# Patient Record
Sex: FEMALE | Race: WHITE | NOT HISPANIC OR LATINO | Employment: FULL TIME | URBAN - NONMETROPOLITAN AREA
[De-identification: names, ages, dates, MRNs, and addresses within clinical notes are randomized per-mention and may not be internally consistent; named-entity substitution may affect disease eponyms.]

---

## 2017-01-13 RX ORDER — MONTELUKAST SODIUM 10 MG/1
TABLET ORAL
Qty: 30 TABLET | Refills: 0 | Status: SHIPPED | OUTPATIENT
Start: 2017-01-13 | End: 2017-01-20

## 2017-01-20 ENCOUNTER — OFFICE VISIT (OUTPATIENT)
Dept: OBSTETRICS AND GYNECOLOGY | Facility: CLINIC | Age: 22
End: 2017-01-20

## 2017-01-20 VITALS
DIASTOLIC BLOOD PRESSURE: 77 MMHG | BODY MASS INDEX: 20.36 KG/M2 | HEIGHT: 59 IN | WEIGHT: 101 LBS | SYSTOLIC BLOOD PRESSURE: 116 MMHG | HEART RATE: 95 BPM

## 2017-01-20 DIAGNOSIS — Z30.41 SURVEILLANCE OF CONTRACEPTIVE PILL: Primary | ICD-10-CM

## 2017-01-20 PROCEDURE — 99213 OFFICE O/P EST LOW 20 MIN: CPT | Performed by: NURSE PRACTITIONER

## 2017-01-20 RX ORDER — NORETHINDRONE ACETATE AND ETHINYL ESTRADIOL AND FERROUS FUMARATE 1MG-20(24)
1 KIT ORAL DAILY
Qty: 84 TABLET | Refills: 4 | Status: SHIPPED | OUTPATIENT
Start: 2017-01-20 | End: 2018-03-15 | Stop reason: SDUPTHER

## 2017-01-20 RX ORDER — MONTELUKAST SODIUM 10 MG/1
1 TABLET ORAL
COMMUNITY
Start: 2016-10-07 | End: 2017-06-27

## 2017-01-20 RX ORDER — NORETHINDRONE ACETATE AND ETHINYL ESTRADIOL AND FERROUS FUMARATE 1MG-20(24)
1 KIT ORAL DAILY
COMMUNITY
Start: 2016-10-07 | End: 2017-01-20 | Stop reason: SDUPTHER

## 2017-01-20 RX ORDER — FLUTICASONE PROPIONATE 50 MCG
1 SPRAY, SUSPENSION (ML) NASAL
COMMUNITY
End: 2019-11-01

## 2017-01-20 NOTE — PROGRESS NOTES
Subjective   Arlette Zaidi is a 22 y.o. G0 here for her annual exam and birth control refills. Has no complaints at this time and declines STD testing.    Gynecologic Exam   The patient's pertinent negatives include no genital itching, genital lesions, genital odor, genital rash, missed menses, pelvic pain, vaginal bleeding or vaginal discharge. Pertinent negatives include no dysuria, headaches, nausea, rash or vomiting. She uses oral contraceptives for contraception. Her menstrual history has been regular. There is no history of an abdominal surgery, a  section, an ectopic pregnancy, a gynecological surgery, herpes simplex, miscarriage, ovarian cysts, an STD, a terminated pregnancy or vaginosis. (Hx of pelvic pain and dysmenorrhea)   Last pap- 6/12/15 WNL  LMP 1 week ago, no problems    The following portions of the patient's history were reviewed and updated as appropriate: allergies, current medications, past family history, past medical history, past social history, past surgical history and problem list.    Review of Systems   Respiratory: Negative for apnea, chest tightness and shortness of breath.    Gastrointestinal: Negative for nausea and vomiting.   Genitourinary: Negative for difficulty urinating, dyspareunia, dysuria, genital sores, menstrual problem, missed menses, pelvic pain, vaginal bleeding, vaginal discharge and vaginal pain.   Skin: Negative for color change and rash.   Neurological: Negative for light-headedness and headaches.   Psychiatric/Behavioral: Negative for agitation, dysphoric mood and sleep disturbance. The patient is not nervous/anxious.        Objective   Physical Exam   Constitutional: She is oriented to person, place, and time. She appears well-developed and well-nourished.   Cardiovascular: Normal rate, regular rhythm and normal heart sounds.    Pulmonary/Chest: Effort normal and breath sounds normal. Right breast exhibits no inverted nipple, no mass, no nipple  discharge, no skin change and no tenderness. Left breast exhibits no inverted nipple, no mass, no nipple discharge, no skin change and no tenderness. Breasts are symmetrical. There is no breast swelling.   Genitourinary: No breast bleeding.   Genitourinary Comments: Pap smear not needed, pt has no complaints at this time. Pelvic exam deferred.   Lymphadenopathy:     She has no axillary adenopathy.   Neurological: She is alert and oriented to person, place, and time.   Skin: Skin is warm, dry and intact.   Psychiatric: She has a normal mood and affect. Her behavior is normal.   Nursing note and vitals reviewed.        Assessment/Plan   Arlette was seen today for contraception.    Diagnoses and all orders for this visit:    Surveillance of contraceptive pill  -     norethindrone-ethinyl estradiol-ferrous fumarate (LOESTIN 24 FE) 1-20 MG-MCG(24) per tablet; Take 1 tablet by mouth Daily.

## 2017-01-20 NOTE — LETTER
January 20, 2017     Patient: Arlette Zaidi   YOB: 1995   Date of Visit: 1/20/2017       To Whom It May Concern:    It is my medical opinion that Arlette Zaidi may return to work on 1/23/17.           Sincerely,          This document has been electronically signed by DEIDRA Milan on January 20, 2017 9:36 AM        DEIDRA Milan    CC: No Recipients

## 2017-02-14 RX ORDER — MONTELUKAST SODIUM 10 MG/1
TABLET ORAL
Qty: 30 TABLET | Refills: 0 | Status: SHIPPED | OUTPATIENT
Start: 2017-02-14 | End: 2017-03-15 | Stop reason: SDUPTHER

## 2017-03-16 RX ORDER — MONTELUKAST SODIUM 10 MG/1
TABLET ORAL
Qty: 30 TABLET | Refills: 0 | Status: SHIPPED | OUTPATIENT
Start: 2017-03-16 | End: 2017-04-15 | Stop reason: SDUPTHER

## 2017-04-17 RX ORDER — MONTELUKAST SODIUM 10 MG/1
TABLET ORAL
Qty: 30 TABLET | Refills: 0 | Status: SHIPPED | OUTPATIENT
Start: 2017-04-17 | End: 2017-05-17 | Stop reason: SDUPTHER

## 2017-05-18 RX ORDER — MONTELUKAST SODIUM 10 MG/1
TABLET ORAL
Qty: 30 TABLET | Refills: 0 | Status: SHIPPED | OUTPATIENT
Start: 2017-05-18 | End: 2017-06-15 | Stop reason: SDUPTHER

## 2017-06-16 RX ORDER — MONTELUKAST SODIUM 10 MG/1
TABLET ORAL
Qty: 14 TABLET | Refills: 0 | Status: SHIPPED | OUTPATIENT
Start: 2017-06-16 | End: 2017-07-01 | Stop reason: SDUPTHER

## 2017-06-27 ENCOUNTER — APPOINTMENT (OUTPATIENT)
Dept: LAB | Facility: HOSPITAL | Age: 22
End: 2017-06-27

## 2017-06-27 ENCOUNTER — OFFICE VISIT (OUTPATIENT)
Dept: PULMONOLOGY | Facility: CLINIC | Age: 22
End: 2017-06-27

## 2017-06-27 VITALS
DIASTOLIC BLOOD PRESSURE: 60 MMHG | SYSTOLIC BLOOD PRESSURE: 99 MMHG | HEART RATE: 85 BPM | BODY MASS INDEX: 19.35 KG/M2 | WEIGHT: 96 LBS | OXYGEN SATURATION: 99 % | HEIGHT: 59 IN

## 2017-06-27 DIAGNOSIS — J30.2 SEASONAL ALLERGIC RHINITIS, UNSPECIFIED ALLERGIC RHINITIS TRIGGER: Primary | ICD-10-CM

## 2017-06-27 PROBLEM — Z09 SURGICAL FOLLOW-UP CARE: Status: ACTIVE | Noted: 2017-04-20

## 2017-06-27 PROCEDURE — 86003 ALLG SPEC IGE CRUDE XTRC EA: CPT | Performed by: INTERNAL MEDICINE

## 2017-06-27 PROCEDURE — 36415 COLL VENOUS BLD VENIPUNCTURE: CPT | Performed by: INTERNAL MEDICINE

## 2017-06-27 PROCEDURE — 99213 OFFICE O/P EST LOW 20 MIN: CPT | Performed by: INTERNAL MEDICINE

## 2017-06-27 PROCEDURE — 96372 THER/PROPH/DIAG INJ SC/IM: CPT | Performed by: INTERNAL MEDICINE

## 2017-06-27 RX ORDER — AZITHROMYCIN 250 MG/1
TABLET, FILM COATED ORAL
Qty: 6 TABLET | Refills: 1 | Status: SHIPPED | OUTPATIENT
Start: 2017-06-27 | End: 2018-03-09

## 2017-06-27 RX ORDER — PREDNISONE 10 MG/1
TABLET ORAL
Qty: 21 TABLET | Refills: 0 | Status: SHIPPED | OUTPATIENT
Start: 2017-06-27 | End: 2018-03-09

## 2017-06-27 RX ORDER — METHYLPREDNISOLONE ACETATE 40 MG/ML
80 INJECTION, SUSPENSION INTRA-ARTICULAR; INTRALESIONAL; INTRAMUSCULAR; SOFT TISSUE ONCE
Status: COMPLETED | OUTPATIENT
Start: 2017-06-27 | End: 2017-06-27

## 2017-06-27 RX ADMIN — METHYLPREDNISOLONE ACETATE 80 MG: 40 INJECTION, SUSPENSION INTRA-ARTICULAR; INTRALESIONAL; INTRAMUSCULAR; SOFT TISSUE at 10:20

## 2017-06-27 NOTE — PROGRESS NOTES
"This showing lady has chronic allergic rhinitis.  She had been doing well and visited family who had a dog and since that time she's had sneezing and itchy eyes and nasal congestion.    ROS    Constitutional-no night sweats weight loss headaches  GI no abdominal pain nausea or diarrhea  Neuro no seizure or neurologic deficits  Musculoskeletal no deformity or joint pain   no dysuria or hematuria  Skin no rash or other lesions  All other systems reviewed and were negative except for the above.      Physical Exam  BP 99/60 (BP Location: Left arm, Patient Position: Sitting, Cuff Size: Adult)  Pulse 85  Ht 59\" (149.9 cm)  Wt 96 lb (43.5 kg)  SpO2 99%  BMI 19.39 kg/m2  Vital signs as above  Pupils equally round and reactive to light and accommodation, neck no JVD or adenopathy.  Cardiovascular regular rhythm and rate no murmur or gallop.  Abdomen soft no organomegaly tenderness.  Extremities no clubbing cyanosis or edema.  No cervical adenopathy.  No skin rash.  Neurologic good strength bilaterally without deficits  Nose is congested, throat clear, lungs are clear    Impression allergic rhinitis with exacerbation    Plan allergy blood test, Depo-Medrol, Zithromax and prednisone if needed, return as needed  "

## 2017-06-30 LAB
A ALTERNATA IGE QN: 14.6 KU/L
A FUMIGATUS IGE QN: 0.41 KU/L
AMER ROACH IGE QN: <0.1 KU/L
BAHIA GRASS IGE QN: 1.18 KU/L
BAYBERRY POLN IGE QN: 1.4 KU/L
BERMUDA GRASS IGE QN: 0.96 KU/L
BOXELDER IGE QN: 1.31 KU/L
C HERBARUM IGE QN: 0.31 KU/L
CAT DANDER IGG QN: 0.12 KU/L
COMMON RAGWEED IGE QN: 0.11 KU/L
CONV CLASS DESCRIPTION: ABNORMAL
D FARINAE IGE QN: <0.1 KU/L
D PTERONYSS IGE QN: <0.1 KU/L
DOG DANDER IGE QN: 0.13 KU/L
DOG FENNEL IGE QN: <0.1 KU/L
ENGL PLANTAIN IGE QN: 1.32 KU/L
GOOSEFOOT IGE QN: 1.45 KU/L
GUM-TREE IGE QN: 1.69 KU/L
ITALIAN CYPRESS IGE QN: 0.17 KU/L
JOHNSON GRASS IGE QN: 0.91 KU/L
M RACEMOSUS IGE QN: <0.1 KU/L
P NOTATUM IGE QN: 1.31 KU/L
PEPPER TREE IGE QN: 1.39 KU/L
PER RYE GRASS IGE QN: 1.31 KU/L
QUEEN PALM IGE QN: <0.1 KU/L
S BOTRYOSUM IGE QN: 3.59 KU/L
SHEEP SORREL IGE QN: 1.34 KU/L
T210-IGE PRIVET, COMMON: 0.54 KU/L
VIRG LIVE OAK IGE QN: 1.49 KU/L
WHITE ELM IGE QN: 1.41 KU/L

## 2017-07-03 RX ORDER — MONTELUKAST SODIUM 10 MG/1
TABLET ORAL
Qty: 14 TABLET | Refills: 0 | Status: SHIPPED | OUTPATIENT
Start: 2017-07-03 | End: 2017-07-18 | Stop reason: SDUPTHER

## 2017-07-19 RX ORDER — MONTELUKAST SODIUM 10 MG/1
TABLET ORAL
Qty: 14 TABLET | Refills: 0 | Status: SHIPPED | OUTPATIENT
Start: 2017-07-19 | End: 2017-08-03 | Stop reason: SDUPTHER

## 2017-08-04 RX ORDER — MONTELUKAST SODIUM 10 MG/1
TABLET ORAL
Qty: 14 TABLET | Refills: 0 | Status: SHIPPED | OUTPATIENT
Start: 2017-08-04 | End: 2017-08-17 | Stop reason: SDUPTHER

## 2017-08-18 RX ORDER — MONTELUKAST SODIUM 10 MG/1
TABLET ORAL
Qty: 14 TABLET | Refills: 0 | Status: SHIPPED | OUTPATIENT
Start: 2017-08-18 | End: 2017-09-03 | Stop reason: SDUPTHER

## 2017-09-05 RX ORDER — MONTELUKAST SODIUM 10 MG/1
TABLET ORAL
Qty: 14 TABLET | Refills: 0 | Status: SHIPPED | OUTPATIENT
Start: 2017-09-05 | End: 2017-09-20 | Stop reason: SDUPTHER

## 2017-09-21 RX ORDER — MONTELUKAST SODIUM 10 MG/1
TABLET ORAL
Qty: 14 TABLET | Refills: 0 | Status: SHIPPED | OUTPATIENT
Start: 2017-09-21 | End: 2017-10-04 | Stop reason: SDUPTHER

## 2017-10-05 RX ORDER — MONTELUKAST SODIUM 10 MG/1
TABLET ORAL
Qty: 14 TABLET | Refills: 0 | Status: SHIPPED | OUTPATIENT
Start: 2017-10-05 | End: 2017-10-19 | Stop reason: SDUPTHER

## 2017-10-20 RX ORDER — MONTELUKAST SODIUM 10 MG/1
TABLET ORAL
Qty: 14 TABLET | Refills: 0 | Status: SHIPPED | OUTPATIENT
Start: 2017-10-20 | End: 2017-11-03 | Stop reason: SDUPTHER

## 2017-11-06 RX ORDER — MONTELUKAST SODIUM 10 MG/1
TABLET ORAL
Qty: 14 TABLET | Refills: 0 | Status: SHIPPED | OUTPATIENT
Start: 2017-11-06 | End: 2017-11-18 | Stop reason: SDUPTHER

## 2017-11-20 RX ORDER — MONTELUKAST SODIUM 10 MG/1
TABLET ORAL
Qty: 14 TABLET | Refills: 0 | Status: SHIPPED | OUTPATIENT
Start: 2017-11-20 | End: 2017-12-03 | Stop reason: SDUPTHER

## 2017-12-04 RX ORDER — MONTELUKAST SODIUM 10 MG/1
TABLET ORAL
Qty: 14 TABLET | Refills: 0 | Status: SHIPPED | OUTPATIENT
Start: 2017-12-04 | End: 2019-11-01

## 2018-03-09 ENCOUNTER — OFFICE VISIT (OUTPATIENT)
Dept: FAMILY MEDICINE CLINIC | Facility: CLINIC | Age: 23
End: 2018-03-09

## 2018-03-09 VITALS
BODY MASS INDEX: 19.56 KG/M2 | OXYGEN SATURATION: 99 % | WEIGHT: 97 LBS | HEART RATE: 96 BPM | SYSTOLIC BLOOD PRESSURE: 100 MMHG | DIASTOLIC BLOOD PRESSURE: 60 MMHG | HEIGHT: 59 IN

## 2018-03-09 DIAGNOSIS — Z91.09 ENVIRONMENTAL ALLERGIES: ICD-10-CM

## 2018-03-09 DIAGNOSIS — F51.01 PRIMARY INSOMNIA: Primary | ICD-10-CM

## 2018-03-09 PROCEDURE — 99213 OFFICE O/P EST LOW 20 MIN: CPT | Performed by: FAMILY MEDICINE

## 2018-03-09 RX ORDER — TRAZODONE HYDROCHLORIDE 50 MG/1
50 TABLET ORAL NIGHTLY
Qty: 30 TABLET | Refills: 3 | Status: SHIPPED | OUTPATIENT
Start: 2018-03-09 | End: 2018-07-27 | Stop reason: SDUPTHER

## 2018-03-09 RX ORDER — FEXOFENADINE HCL 180 MG/1
180 TABLET ORAL DAILY
COMMUNITY
End: 2019-11-01

## 2018-03-10 NOTE — PROGRESS NOTES
Subjective   Arlette Zaidi is a 23 y.o. female.     History of Present Illness The patient comes in for check of difficulty sleeping. She goes to bed at 21:00 and awakens in two to three hours. This has been going on for several months.He allergies are stable.    The following portions of the patient's history were reviewed and updated as appropriate: allergies, current medications, past family history, past medical history, past social history, past surgical history and problem list.    Review of Systems   Constitutional: Negative for fatigue and fever.   Respiratory: Negative for cough, chest tightness and stridor.    Cardiovascular: Negative for chest pain, palpitations and leg swelling.   Psychiatric/Behavioral: Positive for sleep disturbance. Negative for confusion and suicidal ideas. The patient is not nervous/anxious.        Objective   Physical Exam   Constitutional: She appears well-developed and well-nourished.   HENT:   Head: Normocephalic and atraumatic.   Right Ear: External ear normal.   Left Ear: External ear normal.   Nose: Nose normal.   Mouth/Throat: Oropharynx is clear and moist.   Eyes: Pupils are equal, round, and reactive to light.   Neck: Normal range of motion.   Cardiovascular: Normal rate, regular rhythm and normal heart sounds.  Exam reveals no gallop and no friction rub.    No murmur heard.  Pulmonary/Chest: Effort normal and breath sounds normal. No respiratory distress. She has no wheezes. She has no rales.   Abdominal: Soft. Bowel sounds are normal. She exhibits no distension. There is no tenderness.   Skin: Skin is warm and dry.   Vitals reviewed.      Assessment/Plan   Arlette was seen today for insomnia.    Diagnoses and all orders for this visit:    Primary insomnia    Environmental allergies    Other orders  -     traZODone (DESYREL) 50 MG tablet; Take 1 tablet by mouth Every Night.      Return in 2 months.  Trazodone as ordered.  Continue on current medications.

## 2018-03-15 DIAGNOSIS — Z30.41 SURVEILLANCE OF CONTRACEPTIVE PILL: ICD-10-CM

## 2018-03-16 RX ORDER — NORETHINDRONE ACETATE AND ETHINYL ESTRADIOL 1MG-20(24)
1 KIT ORAL DAILY
Qty: 84 TABLET | Refills: 0 | Status: SHIPPED | OUTPATIENT
Start: 2018-03-16 | End: 2018-07-06 | Stop reason: SDUPTHER

## 2018-06-12 DIAGNOSIS — Z30.41 SURVEILLANCE OF CONTRACEPTIVE PILL: ICD-10-CM

## 2018-06-14 RX ORDER — NORETHINDRONE ACETATE AND ETHINYL ESTRADIOL AND FERROUS FUMARATE 1MG-20(24)
1 KIT ORAL DAILY
Qty: 84 TABLET | Refills: 0 | OUTPATIENT
Start: 2018-06-14

## 2018-06-15 ENCOUNTER — OFFICE VISIT (OUTPATIENT)
Dept: FAMILY MEDICINE CLINIC | Facility: CLINIC | Age: 23
End: 2018-06-15

## 2018-06-15 VITALS
HEART RATE: 87 BPM | BODY MASS INDEX: 19.96 KG/M2 | OXYGEN SATURATION: 99 % | SYSTOLIC BLOOD PRESSURE: 90 MMHG | DIASTOLIC BLOOD PRESSURE: 60 MMHG | WEIGHT: 99 LBS | HEIGHT: 59 IN

## 2018-06-15 DIAGNOSIS — T78.1XXA ADVERSE FOOD REACTION, INITIAL ENCOUNTER: ICD-10-CM

## 2018-06-15 DIAGNOSIS — R10.32 LEFT LOWER QUADRANT PAIN: Primary | ICD-10-CM

## 2018-06-15 PROCEDURE — 99213 OFFICE O/P EST LOW 20 MIN: CPT | Performed by: FAMILY MEDICINE

## 2018-06-15 RX ORDER — DICYCLOMINE HYDROCHLORIDE 10 MG/1
CAPSULE ORAL
Qty: 28 CAPSULE | Refills: 0 | Status: SHIPPED | OUTPATIENT
Start: 2018-06-15 | End: 2018-06-29

## 2018-06-15 NOTE — PROGRESS NOTES
Subjective   Arlette Zaidi is a 23 y.o. female.     History of Present Illness Patient comes in for check of left lower quadrant pain. It is associated with taking coffee and cokes. She denies N/V and diarrhea.    The following portions of the patient's history were reviewed and updated as appropriate: allergies, current medications, past family history, past medical history, past social history, past surgical history and problem list.    Review of Systems   Constitutional: Negative for fatigue and fever.   Gastrointestinal: Positive for abdominal pain. Negative for abdominal distention, constipation, diarrhea, nausea, rectal pain, vomiting and indigestion.       Objective   Physical Exam   Constitutional: She appears well-developed and well-nourished.   HENT:   Head: Normocephalic and atraumatic.   Right Ear: External ear normal.   Left Ear: External ear normal.   Nose: Nose normal.   Mouth/Throat: Oropharynx is clear and moist.   Eyes: Pupils are equal, round, and reactive to light.   Cardiovascular: Normal rate, regular rhythm and normal heart sounds.  Exam reveals no gallop and no friction rub.    No murmur heard.  Pulmonary/Chest: Effort normal and breath sounds normal. No respiratory distress. She has no wheezes. She has no rales.   Abdominal: Soft. Bowel sounds are normal. She exhibits no distension and no mass. There is tenderness. There is no rebound and no guarding.   Skin: Skin is warm and dry.   Vitals reviewed.        Assessment/Plan   Arlette was seen today for pelvic pain.    Diagnoses and all orders for this visit:    Left lower quadrant pain    Adverse food reaction, initial encounter    Other orders  -     dicyclomine (BENTYL) 10 MG capsule; One twice daily.        Return in 2 weeks.

## 2018-06-29 ENCOUNTER — OFFICE VISIT (OUTPATIENT)
Dept: FAMILY MEDICINE CLINIC | Facility: CLINIC | Age: 23
End: 2018-06-29

## 2018-06-29 VITALS
HEART RATE: 85 BPM | BODY MASS INDEX: 20.36 KG/M2 | WEIGHT: 101 LBS | DIASTOLIC BLOOD PRESSURE: 70 MMHG | SYSTOLIC BLOOD PRESSURE: 92 MMHG | OXYGEN SATURATION: 99 % | HEIGHT: 59 IN

## 2018-06-29 DIAGNOSIS — R10.32 LEFT LOWER QUADRANT PAIN: Primary | ICD-10-CM

## 2018-06-29 DIAGNOSIS — R10.32 LLQ CRAMPING: ICD-10-CM

## 2018-06-29 PROCEDURE — 99213 OFFICE O/P EST LOW 20 MIN: CPT | Performed by: FAMILY MEDICINE

## 2018-06-29 RX ORDER — HYOSCYAMINE SULFATE EXTENDED-RELEASE 0.38 MG/1
0.38 TABLET ORAL EVERY 12 HOURS PRN
Qty: 60 TABLET | Refills: 1 | Status: SHIPPED | OUTPATIENT
Start: 2018-06-29 | End: 2018-07-30 | Stop reason: SDUPTHER

## 2018-06-29 NOTE — PROGRESS NOTES
Subjective   Arlette Zaidi is a 23 y.o. female.     History of Present Illness The patient comes in for check of her abdominal cramping.The Bentyl hasn't been too effective.      The following portions of the patient's history were reviewed and updated as appropriate: allergies, current medications, past family history, past medical history, past social history, past surgical history and problem list.    Review of Systems   Gastrointestinal: Positive for abdominal pain. Negative for blood in stool and constipation.       Objective   Physical Exam   Constitutional: She appears well-developed and well-nourished.   HENT:   Head: Normocephalic and atraumatic.   Right Ear: External ear normal.   Left Ear: External ear normal.   Nose: Nose normal.   Mouth/Throat: Oropharynx is clear and moist.   Eyes: Pupils are equal, round, and reactive to light.   Cardiovascular: Normal rate, regular rhythm and normal heart sounds.    Pulmonary/Chest: Effort normal.   Abdominal: Soft. Bowel sounds are normal. She exhibits no distension.   Mild left lower quadrant tenderness.   Vitals reviewed.        Assessment/Plan   Arlette was seen today for pelvic pain and follow-up.    Diagnoses and all orders for this visit:    Left lower quadrant pain    LLQ cramping    Other orders  -     hyoscyamine (LEVBID) 0.375 MG 12 hr tablet; Take 1 tablet by mouth Every 12 (Twelve) Hours As Needed for Cramping.      Discontinue Bentyl.  Return to clinic in 6 weeks.

## 2018-07-06 ENCOUNTER — OFFICE VISIT (OUTPATIENT)
Dept: OBSTETRICS AND GYNECOLOGY | Facility: CLINIC | Age: 23
End: 2018-07-06

## 2018-07-06 VITALS
SYSTOLIC BLOOD PRESSURE: 119 MMHG | HEART RATE: 104 BPM | DIASTOLIC BLOOD PRESSURE: 78 MMHG | BODY MASS INDEX: 20.76 KG/M2 | WEIGHT: 103 LBS | HEIGHT: 59 IN

## 2018-07-06 DIAGNOSIS — Z30.41 SURVEILLANCE OF CONTRACEPTIVE PILL: ICD-10-CM

## 2018-07-06 PROCEDURE — 99213 OFFICE O/P EST LOW 20 MIN: CPT | Performed by: NURSE PRACTITIONER

## 2018-07-06 RX ORDER — NORETHINDRONE ACETATE AND ETHINYL ESTRADIOL AND FERROUS FUMARATE 1MG-20(24)
1 KIT ORAL DAILY
Qty: 84 TABLET | Refills: 4 | Status: SHIPPED | OUTPATIENT
Start: 2018-07-06 | End: 2018-07-30

## 2018-07-27 RX ORDER — TRAZODONE HYDROCHLORIDE 50 MG/1
50 TABLET ORAL NIGHTLY
Qty: 30 TABLET | Refills: 0 | Status: SHIPPED | OUTPATIENT
Start: 2018-07-27 | End: 2018-08-28 | Stop reason: SDUPTHER

## 2018-07-30 ENCOUNTER — OFFICE VISIT (OUTPATIENT)
Dept: FAMILY MEDICINE CLINIC | Facility: CLINIC | Age: 23
End: 2018-07-30

## 2018-07-30 VITALS
DIASTOLIC BLOOD PRESSURE: 74 MMHG | HEART RATE: 100 BPM | BODY MASS INDEX: 20.17 KG/M2 | OXYGEN SATURATION: 100 % | WEIGHT: 100.06 LBS | SYSTOLIC BLOOD PRESSURE: 116 MMHG | HEIGHT: 59 IN

## 2018-07-30 DIAGNOSIS — K58.9 IRRITABLE BOWEL SYNDROME, UNSPECIFIED TYPE: Primary | ICD-10-CM

## 2018-07-30 DIAGNOSIS — R10.9 ABDOMINAL CRAMPING: ICD-10-CM

## 2018-07-30 PROCEDURE — 99214 OFFICE O/P EST MOD 30 MIN: CPT | Performed by: FAMILY MEDICINE

## 2018-07-30 RX ORDER — HYOSCYAMINE SULFATE 0.125 MG
0.12 TABLET ORAL
COMMUNITY
End: 2018-07-30

## 2018-07-30 RX ORDER — HYOSCYAMINE SULFATE EXTENDED-RELEASE 0.38 MG/1
0.38 TABLET ORAL EVERY 12 HOURS PRN
Qty: 60 TABLET | Refills: 4 | Status: SHIPPED | OUTPATIENT
Start: 2018-07-30 | End: 2018-11-02 | Stop reason: ALTCHOICE

## 2018-07-30 NOTE — PROGRESS NOTES
Subjective   Arlette Zaidi is a 23 y.o. female.     History of Present Illness The patient comes in for check of her chronic abdominal discomfort. Her weight is stable. As long as she takes the medication,she is good. If she doesn't,it flares up.    The following portions of the patient's history were reviewed and updated as appropriate: allergies, current medications, past family history, past medical history, past social history, past surgical history and problem list.    Review of Systems   Constitutional: Negative for fatigue and fever.   Cardiovascular: Negative for chest pain and leg swelling.   Gastrointestinal: Negative for abdominal distention, abdominal pain, diarrhea, nausea and vomiting.       Objective   Physical Exam   Constitutional: She appears well-developed and well-nourished.   HENT:   Head: Normocephalic and atraumatic.   Right Ear: External ear normal.   Left Ear: External ear normal.   Nose: Nose normal.   Mouth/Throat: Oropharynx is clear and moist.   Eyes: Pupils are equal, round, and reactive to light.   Neck: Normal range of motion.   Cardiovascular: Normal rate, regular rhythm and normal heart sounds.  Exam reveals no gallop and no friction rub.    No murmur heard.  Pulmonary/Chest: Effort normal and breath sounds normal. No respiratory distress. She has no wheezes. She has no rales.   Abdominal: Soft. Bowel sounds are normal. She exhibits no distension. There is no tenderness. There is no guarding.   Skin: Skin is warm and dry.   Vitals reviewed.        Assessment/Plan   Arlette was seen today for follow-up.    Diagnoses and all orders for this visit:    Irritable bowel syndrome, unspecified type    Abdominal cramping    Other orders  -     hyoscyamine (LEVBID) 0.375 MG 12 hr tablet; Take 1 tablet by mouth Every 12 (Twelve) Hours As Needed for Cramping.    Return to the clinic in 6 months.  Will contact with results as needed.

## 2018-08-29 RX ORDER — TRAZODONE HYDROCHLORIDE 50 MG/1
50 TABLET ORAL NIGHTLY
Qty: 30 TABLET | Refills: 3 | Status: SHIPPED | OUTPATIENT
Start: 2018-08-29 | End: 2019-01-30 | Stop reason: SDUPTHER

## 2018-09-19 ENCOUNTER — LAB (OUTPATIENT)
Dept: LAB | Facility: HOSPITAL | Age: 23
End: 2018-09-19

## 2018-09-19 ENCOUNTER — OFFICE VISIT (OUTPATIENT)
Dept: FAMILY MEDICINE CLINIC | Facility: CLINIC | Age: 23
End: 2018-09-19

## 2018-09-19 VITALS
WEIGHT: 95.38 LBS | HEIGHT: 59 IN | HEART RATE: 96 BPM | DIASTOLIC BLOOD PRESSURE: 70 MMHG | OXYGEN SATURATION: 100 % | BODY MASS INDEX: 19.23 KG/M2 | SYSTOLIC BLOOD PRESSURE: 118 MMHG

## 2018-09-19 DIAGNOSIS — R19.7 DIARRHEA, UNSPECIFIED TYPE: Primary | ICD-10-CM

## 2018-09-19 DIAGNOSIS — R19.7 DIARRHEA, UNSPECIFIED TYPE: ICD-10-CM

## 2018-09-19 DIAGNOSIS — R10.9 ABDOMINAL CRAMPING: ICD-10-CM

## 2018-09-19 DIAGNOSIS — K58.0 IRRITABLE BOWEL SYNDROME WITH DIARRHEA: ICD-10-CM

## 2018-09-19 LAB
ANION GAP SERPL CALCULATED.3IONS-SCNC: 11 MMOL/L (ref 5–15)
BUN BLD-MCNC: 6 MG/DL (ref 7–21)
BUN/CREAT SERPL: 11.5 (ref 7–25)
CALCIUM SPEC-SCNC: 9.5 MG/DL (ref 8.4–10.2)
CHLORIDE SERPL-SCNC: 102 MMOL/L (ref 95–110)
CO2 SERPL-SCNC: 26 MMOL/L (ref 22–31)
CREAT BLD-MCNC: 0.52 MG/DL (ref 0.5–1)
GFR SERPL CREATININE-BSD FRML MDRD: 145 ML/MIN/1.73 (ref 71–165)
GLUCOSE BLD-MCNC: 81 MG/DL (ref 60–100)
POTASSIUM BLD-SCNC: 3.7 MMOL/L (ref 3.5–5.1)
SODIUM BLD-SCNC: 139 MMOL/L (ref 137–145)

## 2018-09-19 PROCEDURE — 99214 OFFICE O/P EST MOD 30 MIN: CPT | Performed by: FAMILY MEDICINE

## 2018-09-19 PROCEDURE — 36415 COLL VENOUS BLD VENIPUNCTURE: CPT

## 2018-09-19 PROCEDURE — 80048 BASIC METABOLIC PNL TOTAL CA: CPT

## 2018-09-19 RX ORDER — OMEPRAZOLE 40 MG/1
40 CAPSULE, DELAYED RELEASE ORAL DAILY
Qty: 30 CAPSULE | Refills: 0 | Status: SHIPPED | OUTPATIENT
Start: 2018-09-19 | End: 2018-11-02 | Stop reason: ALTCHOICE

## 2018-09-19 NOTE — PROGRESS NOTES
Subjective   Arlette Zaidi is a 23 y.o. female.   Cc:stomach issues  History of Present Illness The patient comes in for follow up for her issue with her stomach.She is continuing to have abdominal cramping and has had diarrhea.    The following portions of the patient's history were reviewed and updated as appropriate: allergies, current medications, past family history, past medical history, past social history, past surgical history and problem list.    Review of Systems   Constitutional: Negative for fatigue and fever.   Respiratory: Negative for cough, chest tightness and stridor.    Cardiovascular: Negative for chest pain, palpitations and leg swelling.   Gastrointestinal: Positive for abdominal pain. Negative for abdominal distention.       Objective   Physical Exam   Constitutional: She is oriented to person, place, and time. She appears well-developed and well-nourished.   HENT:   Head: Normocephalic and atraumatic.   Right Ear: External ear normal.   Left Ear: External ear normal.   Nose: Nose normal.   Eyes: Pupils are equal, round, and reactive to light.   Neck: Normal range of motion.   Cardiovascular: Normal rate, regular rhythm and normal heart sounds.  Exam reveals no gallop and no friction rub.    No murmur heard.  Pulmonary/Chest: Effort normal and breath sounds normal. No respiratory distress. She has no wheezes. She has no rales.   Abdominal: Soft. Bowel sounds are normal. She exhibits no distension. There is no tenderness. There is no guarding.   Neurological: She is alert and oriented to person, place, and time.   Skin: Skin is warm and dry.   Vitals reviewed.        Assessment/Plan   Arlette was seen today for diarrhea, nausea and abdominal cramping.    Diagnoses and all orders for this visit:    Diarrhea, unspecified type    Irritable bowel syndrome with diarrhea    Abdominal cramping        Return to the clinic in 2 weeks..  Will contact with results as needed.

## 2018-09-25 RX ORDER — ONDANSETRON 8 MG/1
TABLET, ORALLY DISINTEGRATING ORAL
Qty: 15 TABLET | Refills: 0 | Status: SHIPPED | OUTPATIENT
Start: 2018-09-25 | End: 2018-11-06 | Stop reason: SDUPTHER

## 2018-10-19 DIAGNOSIS — R10.84 GENERALIZED ABDOMINAL PAIN: Primary | ICD-10-CM

## 2018-10-19 DIAGNOSIS — K58.0 IRRITABLE BOWEL SYNDROME WITH DIARRHEA: ICD-10-CM

## 2018-10-26 DIAGNOSIS — K58.0 IRRITABLE BOWEL SYNDROME WITH DIARRHEA: Primary | ICD-10-CM

## 2018-10-26 DIAGNOSIS — R10.84 GENERALIZED ABDOMINAL PAIN: ICD-10-CM

## 2018-11-02 ENCOUNTER — LAB (OUTPATIENT)
Dept: LAB | Facility: HOSPITAL | Age: 23
End: 2018-11-02

## 2018-11-02 ENCOUNTER — OFFICE VISIT (OUTPATIENT)
Dept: GASTROENTEROLOGY | Facility: CLINIC | Age: 23
End: 2018-11-02

## 2018-11-02 VITALS
SYSTOLIC BLOOD PRESSURE: 92 MMHG | HEART RATE: 112 BPM | HEIGHT: 60 IN | WEIGHT: 90.6 LBS | BODY MASS INDEX: 17.79 KG/M2 | OXYGEN SATURATION: 99 % | DIASTOLIC BLOOD PRESSURE: 62 MMHG

## 2018-11-02 DIAGNOSIS — R11.0 NAUSEA: Primary | ICD-10-CM

## 2018-11-02 DIAGNOSIS — R11.0 NAUSEA: ICD-10-CM

## 2018-11-02 DIAGNOSIS — R63.4 WEIGHT LOSS, ABNORMAL: ICD-10-CM

## 2018-11-02 DIAGNOSIS — R19.7 DIARRHEA, UNSPECIFIED TYPE: ICD-10-CM

## 2018-11-02 LAB
ALBUMIN SERPL-MCNC: 4.6 G/DL (ref 3.4–4.8)
ALP SERPL-CCNC: 104 U/L (ref 38–126)
ALT SERPL W P-5'-P-CCNC: 40 U/L (ref 9–52)
AST SERPL-CCNC: 43 U/L (ref 14–36)
BASOPHILS # BLD AUTO: 0.04 10*3/MM3 (ref 0–0.2)
BASOPHILS NFR BLD AUTO: 0.5 % (ref 0–2)
BILIRUB CONJ SERPL-MCNC: 0 MG/DL (ref 0–0.3)
BILIRUB INDIRECT SERPL-MCNC: 1.4 MG/DL (ref 0–1.1)
BILIRUB SERPL-MCNC: 1.7 MG/DL (ref 0.2–1.3)
DEPRECATED RDW RBC AUTO: 41.4 FL (ref 36.4–46.3)
EOSINOPHIL # BLD AUTO: 0.13 10*3/MM3 (ref 0–0.7)
EOSINOPHIL NFR BLD AUTO: 1.8 % (ref 0–7)
ERYTHROCYTE [DISTWIDTH] IN BLOOD BY AUTOMATED COUNT: 12.4 % (ref 11.5–14.5)
HCT VFR BLD AUTO: 39.3 % (ref 35–45)
HGB BLD-MCNC: 14.4 G/DL (ref 12–15.5)
IMM GRANULOCYTES # BLD: 0.03 10*3/MM3 (ref 0–0.02)
IMM GRANULOCYTES NFR BLD: 0.4 % (ref 0–0.5)
LYMPHOCYTES # BLD AUTO: 1.73 10*3/MM3 (ref 0.6–4.2)
LYMPHOCYTES NFR BLD AUTO: 23.4 % (ref 10–50)
MCH RBC QN AUTO: 33.1 PG (ref 26.5–34)
MCHC RBC AUTO-ENTMCNC: 36.6 G/DL (ref 31.4–36)
MCV RBC AUTO: 90.3 FL (ref 80–98)
MONOCYTES # BLD AUTO: 0.58 10*3/MM3 (ref 0–0.9)
MONOCYTES NFR BLD AUTO: 7.8 % (ref 0–12)
NEUTROPHILS # BLD AUTO: 4.89 10*3/MM3 (ref 2–8.6)
NEUTROPHILS NFR BLD AUTO: 66.1 % (ref 37–80)
NRBC BLD MANUAL-RTO: 0 /100 WBC (ref 0–0)
PLATELET # BLD AUTO: 383 10*3/MM3 (ref 150–450)
PMV BLD AUTO: 9.1 FL (ref 8–12)
PROT SERPL-MCNC: 8.1 G/DL (ref 6.3–8.6)
RBC # BLD AUTO: 4.35 10*6/MM3 (ref 3.77–5.16)
WBC NRBC COR # BLD: 7.4 10*3/MM3 (ref 3.2–9.8)

## 2018-11-02 PROCEDURE — 80076 HEPATIC FUNCTION PANEL: CPT | Performed by: NURSE PRACTITIONER

## 2018-11-02 PROCEDURE — 83516 IMMUNOASSAY NONANTIBODY: CPT

## 2018-11-02 PROCEDURE — 86003 ALLG SPEC IGE CRUDE XTRC EA: CPT

## 2018-11-02 PROCEDURE — 99214 OFFICE O/P EST MOD 30 MIN: CPT | Performed by: NURSE PRACTITIONER

## 2018-11-02 PROCEDURE — 85025 COMPLETE CBC W/AUTO DIFF WBC: CPT | Performed by: NURSE PRACTITIONER

## 2018-11-02 PROCEDURE — 36415 COLL VENOUS BLD VENIPUNCTURE: CPT | Performed by: NURSE PRACTITIONER

## 2018-11-02 RX ORDER — SODIUM, POTASSIUM,MAG SULFATES 17.5-3.13G
1 SOLUTION, RECONSTITUTED, ORAL ORAL EVERY 12 HOURS
Qty: 2 BOTTLE | Refills: 0 | Status: SHIPPED | OUTPATIENT
Start: 2018-11-02 | End: 2019-01-30

## 2018-11-02 RX ORDER — SUCRALFATE 1 G/1
1 TABLET ORAL 4 TIMES DAILY
Qty: 120 TABLET | Refills: 2 | Status: SHIPPED | OUTPATIENT
Start: 2018-11-02 | End: 2019-06-28

## 2018-11-02 RX ORDER — DEXTROSE AND SODIUM CHLORIDE 5; .45 G/100ML; G/100ML
30 INJECTION, SOLUTION INTRAVENOUS CONTINUOUS PRN
Status: CANCELLED | OUTPATIENT
Start: 2018-11-15

## 2018-11-02 NOTE — PATIENT INSTRUCTIONS
Abdominal Pain, Adult  Many things can cause belly (abdominal) pain. Most times, belly pain is not dangerous. Many cases of belly pain can be watched and treated at home. Sometimes belly pain is serious, though. Your doctor will try to find the cause of your belly pain.  Follow these instructions at home:  · Take over-the-counter and prescription medicines only as told by your doctor. Do not take medicines that help you poop (laxatives) unless told to by your doctor.  · Drink enough fluid to keep your pee (urine) clear or pale yellow.  · Watch your belly pain for any changes.  · Keep all follow-up visits as told by your doctor. This is important.  Contact a doctor if:  · Your belly pain changes or gets worse.  · You are not hungry, or you lose weight without trying.  · You are having trouble pooping (constipated) or have watery poop (diarrhea) for more than 2-3 days.  · You have pain when you pee or poop.  · Your belly pain wakes you up at night.  · Your pain gets worse with meals, after eating, or with certain foods.  · You are throwing up and cannot keep anything down.  · You have a fever.  Get help right away if:  · Your pain does not go away as soon as your doctor says it should.  · You cannot stop throwing up.  · Your pain is only in areas of your belly, such as the right side or the left lower part of the belly.  · You have bloody or black poop, or poop that looks like tar.  · You have very bad pain, cramping, or bloating in your belly.  · You have signs of not having enough fluid or water in your body (dehydration), such as:  ? Dark pee, very little pee, or no pee.  ? Cracked lips.  ? Dry mouth.  ? Sunken eyes.  ? Sleepiness.  ? Weakness.  This information is not intended to replace advice given to you by your health care provider. Make sure you discuss any questions you have with your health care provider.  Document Released: 06/05/2009 Document Revised: 07/07/2017 Document Reviewed: 05/31/2017  [employer]  [employer] Patient Education © 2018 [employer]

## 2018-11-02 NOTE — H&P (VIEW-ONLY)
Chief Complaint   Patient presents with   • Diarrhea   • Abdominal Cramping       Subjective    Arlette Zaidi is a 23 y.o. female. she is being seen for consultation today at the request of Michael Nassar MD    History of Present Illness  23-year-old female presents to discuss upper abdominal pain that occurs with eating.  She has lost 13 pounds since onset of symptoms about 2 months ago.  She has tried omeprazole and Levbid without any relief or significant change in symptoms.  States symptoms are similar to when she had her gallbladder removed due to stones last year.  Reports she is hungry however does not want to eat due to abdominal pain that occurs with eating.  She denies any vomiting reports intermittent nausea denies any concerns for pregnancy.  She has multiple allergies and receives allergy shots per Dr. Johnson.  States she has not been checked for food allergies.  Reports at onset of symptoms she had diarrhea for about 3 days however that has resolved and states her bowel movements are normal about once per day.  Plan; we'll order recurrent GI distress panel, LFTs, EGD and colonoscopy with small bowel biopsies due to nausea abdominal pain and abnormal weight loss.  If LFTs are elevated we'll go ahead and order MRCP due to possible retained common bile duct stone.       The following portions of the patient's history were reviewed and updated as appropriate:   Past Medical History:   Diagnosis Date   • Acute laryngitis    • Allergic rhinitis due to pollen    • Asthma     Asthmatic tendency      • Cough    • Cyst of ovary    • Encounter for initial prescription of contraceptive pills    • Encounter for surveillance of contraceptive pills    • Health maintenance examination    • Nonspecific reaction to tuberculin skin test without active tuberculosis     Negatiave   • Underimmunization status    • Upper respiratory infection    • Vaccination required    • Visit for gynecologic examination       • Well child visit      Past Surgical History:   Procedure Laterality Date   • INJECTION OF MEDICATION  04/17/2015    Celestone (betamethasone) (3)    • INJECTION OF MEDICATION  01/18/2016    Depo Medrol (Methylprednisone) (9)      Family History   Problem Relation Age of Onset   • Ulcerative colitis Maternal Uncle    • Ulcerative colitis Maternal Grandmother      OB History     No data available        Current Outpatient Prescriptions   Medication Sig Dispense Refill   • fexofenadine (ALLEGRA) 180 MG tablet Take 180 mg by mouth Daily.     • fluticasone (FLONASE) 50 MCG/ACT nasal spray 1 spray into each nostril.     • montelukast (SINGULAIR) 10 MG tablet TAKE 1 TABLET BY MOUTH EVERY NIGHT AT BEDTIME 14 tablet 0   • ondansetron ODT (ZOFRAN-ODT) 8 MG disintegrating tablet ALLOW 1 TABLET TO DISSOLVE ON TONGUE EVERY 8 HOURS AS NEEDED FOR NAUSEA AND VOMITING 15 tablet 0   • traZODone (DESYREL) 50 MG tablet TAKE 1 TABLET BY MOUTH EVERY NIGHT 30 tablet 3   • sodium-potassium-magnesium sulfates (SUPREP BOWEL PREP KIT) 17.5-3.13-1.6 GM/177ML solution oral solution Take 1 bottle by mouth Every 12 (Twelve) Hours. 2 bottle 0   • sucralfate (CARAFATE) 1 g tablet Take 1 tablet by mouth 4 (Four) Times a Day. 120 tablet 2     No current facility-administered medications for this visit.      Allergies   Allergen Reactions   • Azithromycin Nausea And Vomiting   • Penicillins GI Intolerance and Nausea Only     Social History     Social History   • Marital status: Single     Social History Main Topics   • Smoking status: Never Smoker   • Smokeless tobacco: Never Used   • Drug use: Unknown     Other Topics Concern   • Not on file       Review of Systems  Review of Systems   Constitutional: Positive for fatigue. Negative for activity change, appetite change, chills, diaphoresis, fever and unexpected weight change.   HENT: Negative for sore throat and trouble swallowing.    Respiratory: Negative for shortness of breath.   "  Gastrointestinal: Positive for abdominal pain, diarrhea (3 days now normal ), nausea and vomiting. Negative for abdominal distention, anal bleeding, blood in stool, constipation and rectal pain.   Musculoskeletal: Negative for arthralgias.   Skin: Negative for pallor.   Neurological: Negative for light-headedness.        BP 92/62 (BP Location: Left arm)   Pulse 112   Ht 152.4 cm (60\")   Wt 41.1 kg (90 lb 9.6 oz)   SpO2 99%   BMI 17.69 kg/m²     Objective    Physical Exam   Constitutional: She is oriented to person, place, and time. She appears well-developed and well-nourished. She is cooperative. No distress.   HENT:   Head: Normocephalic and atraumatic.   Neck: Normal range of motion. Neck supple. No thyromegaly present.   Cardiovascular: Normal rate, regular rhythm and normal heart sounds.    Pulmonary/Chest: Effort normal and breath sounds normal. She has no wheezes. She has no rhonchi. She has no rales.   Abdominal: Soft. Normal appearance and bowel sounds are normal. She exhibits no distension, no fluid wave and no ascites. There is no hepatosplenomegaly. There is tenderness in the right upper quadrant, epigastric area and left upper quadrant. There is no rigidity and no guarding. No hernia.   Lymphadenopathy:     She has no cervical adenopathy.   Neurological: She is alert and oriented to person, place, and time.   Skin: Skin is warm, dry and intact. No rash noted. No pallor.   Psychiatric: She has a normal mood and affect. Her speech is normal.     Lab on 09/19/2018   Component Date Value Ref Range Status   • Glucose 09/19/2018 81  60 - 100 mg/dL Final   • BUN 09/19/2018 6* 7 - 21 mg/dL Final   • Creatinine 09/19/2018 0.52  0.50 - 1.00 mg/dL Final   • Sodium 09/19/2018 139  137 - 145 mmol/L Final   • Potassium 09/19/2018 3.7  3.5 - 5.1 mmol/L Final   • Chloride 09/19/2018 102  95 - 110 mmol/L Final   • CO2 09/19/2018 26.0  22.0 - 31.0 mmol/L Final   • Calcium 09/19/2018 9.5  8.4 - 10.2 mg/dL Final   • " eGFR Non African Amer 09/19/2018 145  71 - 165 mL/min/1.73 Final   • BUN/Creatinine Ratio 09/19/2018 11.5  7.0 - 25.0 Final   • Anion Gap 09/19/2018 11.0  5.0 - 15.0 mmol/L Final     Assessment/Plan      1. Nausea    2. Weight loss, abnormal    3. Diarrhea, unspecified type    .       Orders placed during this encounter include:  Orders Placed This Encounter   Procedures   • Recurrent Gastrointestinal Distress     Standing Status:   Future     Number of Occurrences:   1     Standing Expiration Date:   11/2/2019   • Hepatic Function Panel   • CBC Auto Differential   • Scan Slide     Standing Status:   Future     Number of Occurrences:   1     Standing Expiration Date:   11/2/2019   • Follow Anesthesia Guidelines / Standing Orders     Standing Status:   Future   • CBC & Differential     Order Specific Question:   Manual Differential     Answer:   No       ESOPHAGOGASTRODUODENOSCOPY (N/A), COLONOSCOPY (N/A)    Review and/or summary of lab tests, radiology, procedures, medications. Review and summary of old records and obtaining of history. The risks and benefits of my recommendations, as well as other treatment options were discussed with the patient today. Questions were answered.    New Medications Ordered This Visit   Medications   • sodium-potassium-magnesium sulfates (SUPREP BOWEL PREP KIT) 17.5-3.13-1.6 GM/177ML solution oral solution     Sig: Take 1 bottle by mouth Every 12 (Twelve) Hours.     Dispense:  2 bottle     Refill:  0   • sucralfate (CARAFATE) 1 g tablet     Sig: Take 1 tablet by mouth 4 (Four) Times a Day.     Dispense:  120 tablet     Refill:  2       Follow-up: Return in about 4 weeks (around 11/30/2018).          This document has been electronically signed by DEIDRA Castro on November 2, 2018 1:21 PM             Results for orders placed or performed in visit on 11/02/18   CBC Auto Differential   Result Value Ref Range    WBC 7.40 3.20 - 9.80 10*3/mm3    RBC 4.35 3.77 - 5.16 10*6/mm3     Hemoglobin 14.4 12.0 - 15.5 g/dL    Hematocrit 39.3 35.0 - 45.0 %    MCV 90.3 80.0 - 98.0 fL    MCH 33.1 26.5 - 34.0 pg    MCHC 36.6 (H) 31.4 - 36.0 g/dL    RDW 12.4 11.5 - 14.5 %    RDW-SD 41.4 36.4 - 46.3 fl    MPV 9.1 8.0 - 12.0 fL    Platelets 383 150 - 450 10*3/mm3    Neutrophil % 66.1 37.0 - 80.0 %    Lymphocyte % 23.4 10.0 - 50.0 %    Monocyte % 7.8 0.0 - 12.0 %    Eosinophil % 1.8 0.0 - 7.0 %    Basophil % 0.5 0.0 - 2.0 %    Immature Grans % 0.4 0.0 - 0.5 %    Neutrophils, Absolute 4.89 2.00 - 8.60 10*3/mm3    Lymphocytes, Absolute 1.73 0.60 - 4.20 10*3/mm3    Monocytes, Absolute 0.58 0.00 - 0.90 10*3/mm3    Eosinophils, Absolute 0.13 0.00 - 0.70 10*3/mm3    Basophils, Absolute 0.04 0.00 - 0.20 10*3/mm3    Immature Grans, Absolute 0.03 (H) 0.00 - 0.02 10*3/mm3    nRBC 0.0 0.0 - 0.0 /100 WBC   Hepatic Function Panel   Result Value Ref Range    Total Protein 8.1 6.3 - 8.6 g/dL    Albumin 4.60 3.40 - 4.80 g/dL    ALT (SGPT) 40 9 - 52 U/L    AST (SGOT) 43 (H) 14 - 36 U/L    Alkaline Phosphatase 104 38 - 126 U/L    Total Bilirubin 1.7 (H) 0.2 - 1.3 mg/dL    Bilirubin, Direct 0.0 0.0 - 0.3 mg/dL    Bilirubin, Indirect 1.4 (H) 0.0 - 1.1 mg/dL   Results for orders placed or performed in visit on 09/19/18   Basic metabolic panel   Result Value Ref Range    Glucose 81 60 - 100 mg/dL    BUN 6 (L) 7 - 21 mg/dL    Creatinine 0.52 0.50 - 1.00 mg/dL    Sodium 139 137 - 145 mmol/L    Potassium 3.7 3.5 - 5.1 mmol/L    Chloride 102 95 - 110 mmol/L    CO2 26.0 22.0 - 31.0 mmol/L    Calcium 9.5 8.4 - 10.2 mg/dL    eGFR Non  Amer 145 71 - 165 mL/min/1.73    BUN/Creatinine Ratio 11.5 7.0 - 25.0    Anion Gap 11.0 5.0 - 15.0 mmol/L   Results for orders placed or performed in visit on 06/27/17   Allergens, Zone 5   Result Value Ref Range    Class Description Comment     D. pteronyssinus (dust mite) <0.10 Class 0 kU/L    D. farinae (dust mite) <0.10 Class 0 kU/L    Cat Dander 0.12 (A) Class 0/I kU/L    Dog Dander, IgE 0.13 (A)  Class 0/I kU/L    [place] Grass 0.96 (A) Class II kU/L    Rye, Perennial 1.31 (A) Class II kU/L    [place] Grass 0.91 (A) Class II kU/L    [place] Grass 1.18 (A) Class II kU/L    Cockroach, American <0.10 Class 0 kU/L    Penicillium chrysogen 1.31 (A) Class II kU/L    Cladosporium herbarum 0.31 (A) Class 0/I kU/L    Aspergillus fumigatus 0.41 (A) Class I kU/L    Mucor racemosus <0.10 Class 0 kU/L    Alternaria alternata 14.60 (A) Class IV kU/L    Stemphylium herbarum 3.59 (A) Class III kU/L    Elm, American 1.41 (A) Class III kU/L    Eucalyptus 1.69 (A) Class III kU/L    Maple/[place] 1.31 (A) Class II kU/L    [place], Italian 0.17 (A) Class 0/I kU/L    Pepper Tree 1.39 (A) Class II kU/L    [place], Live/[place] 1.49 (A) Class III kU/L    Privet, Common 0.54 (A) Class I kU/L    Bayberry 1.40 (A) Class II kU/L    [place] Palm <0.10 Class 0 kU/L    Ragweed, Common/Short 0.11 (A) Class 0/I kU/L    English Plantain 1.32 (A) Class II kU/L    Lamb's Quarter 1.45 (A) Class III kU/L    Sheep Sorrel 1.34 (A) Class II kU/L    Dog Fennel <0.10 Class 0 kU/L   Results for orders placed or performed in visit on 11/02/16    PAP SMEAR   Result Value Ref Range     Pap smear Complete    Results for orders placed or performed during the hospital encounter of 09/04/15   Pregnancy, urine   Result Value Ref Range    HCG Urine, QL Negative    Urinalysis Without Microscopic   Result Value Ref Range    Color, UA YELLOW     Appearance CLOUDY     Specific [place], UA 1.020 1.003 - 1.030    pH, UA 7.5 pH Units    Leukocytes, UA NEGATIVE NEGATIVE    Nitrite, UA NEGATIVE NEGATIVE    Protein, UA TRACE (A) NEGATIVE    Glucose, Urine NEGATIVE NEGATIVE mg/dl    Ketones, UA NEGATIVE NEGATIVE    Urobilinogen, UA 1.0 (A) 0.2 EU/dl    Blood, UA NEGATIVE NEGATIVE   CBC and Differential   Result Value Ref Range    WBC 9.2 3.2 - 9.8 x1000/uL    RBC 4.27 3.77 - 5.16 sherri/mm3    Hemoglobin 11.1 (L) 12.0 - 15.5 gm/dl    Hematocrit 33.4 (L) 35.0 - 45.0 %    MCV  78.2 (L) 80.0 - 98.0 fl    MCH 26.0 26.0 - 34.0 pg    MCHC 33.2 31.4 - 36.0 gm/dl    RDW 14.2 11.5 - 14.5 %    Platelets 341 150 - 450 x1000/mm3    MPV 9.0 8.0 - 12.0 fl    Neutrophil Rel % 60.3 37.0 - 80.0 %    Lymphocyte Rel % 30.3 10.0 - 50.0 %    Monocyte Rel % 6.4 0.0 - 12.0 %    Eosinophil Rel % 2.3 0.0 - 7.0 %    Basophil Rel % 0.3 0.0 - 2.0 %    Immature Granulocyte Rel % 0.40 0.00 - 0.50 %    Neutrophils Absolute 5.52 2.00 - 8.60 x1000/uL    Lymphocytes Absolute 2.78 0.60 - 4.20 x1000/uL    Monocytes Absolute 0.59 0.00 - 0.90 x1000/uL    Eosinophils Absolute 0.21 0.00 - 0.70 x1000/uL    Basophils Absolute 0.03 0.00 - 0.20 x1000/uL    Immature Granulocytes Absolute 0.040 (H) 0.005 - 0.022 x1000/uL     *Note: Due to a large number of results and/or encounters for the requested time period, some results have not been displayed. A complete set of results can be found in Results Review.

## 2018-11-02 NOTE — PROGRESS NOTES
Chief Complaint   Patient presents with   • Diarrhea   • Abdominal Cramping       Subjective    Arlette Zaidi is a 23 y.o. female. she is being seen for consultation today at the request of Michael Nassar MD    History of Present Illness  23-year-old female presents to discuss upper abdominal pain that occurs with eating.  She has lost 13 pounds since onset of symptoms about 2 months ago.  She has tried omeprazole and Levbid without any relief or significant change in symptoms.  States symptoms are similar to when she had her gallbladder removed due to stones last year.  Reports she is hungry however does not want to eat due to abdominal pain that occurs with eating.  She denies any vomiting reports intermittent nausea denies any concerns for pregnancy.  She has multiple allergies and receives allergy shots per Dr. Johnson.  States she has not been checked for food allergies.  Reports at onset of symptoms she had diarrhea for about 3 days however that has resolved and states her bowel movements are normal about once per day.  Plan; we'll order recurrent GI distress panel, LFTs, EGD and colonoscopy with small bowel biopsies due to nausea abdominal pain and abnormal weight loss.  If LFTs are elevated we'll go ahead and order MRCP due to possible retained common bile duct stone.       The following portions of the patient's history were reviewed and updated as appropriate:   Past Medical History:   Diagnosis Date   • Acute laryngitis    • Allergic rhinitis due to pollen    • Asthma     Asthmatic tendency      • Cough    • Cyst of ovary    • Encounter for initial prescription of contraceptive pills    • Encounter for surveillance of contraceptive pills    • Health maintenance examination    • Nonspecific reaction to tuberculin skin test without active tuberculosis     Negatiave   • Underimmunization status    • Upper respiratory infection    • Vaccination required    • Visit for gynecologic examination     • Well child visit      Past Surgical History:   Procedure Laterality Date   • INJECTION OF MEDICATION  04/17/2015    Celestone (betamethasone) (3)    • INJECTION OF MEDICATION  01/18/2016    Depo Medrol (Methylprednisone) (9)      Family History   Problem Relation Age of Onset   • Ulcerative colitis Maternal Uncle    • Ulcerative colitis Maternal Grandmother      OB History     No data available        Current Outpatient Prescriptions   Medication Sig Dispense Refill   • fexofenadine (ALLEGRA) 180 MG tablet Take 180 mg by mouth Daily.     • fluticasone (FLONASE) 50 MCG/ACT nasal spray 1 spray into each nostril.     • montelukast (SINGULAIR) 10 MG tablet TAKE 1 TABLET BY MOUTH EVERY NIGHT AT BEDTIME 14 tablet 0   • ondansetron ODT (ZOFRAN-ODT) 8 MG disintegrating tablet ALLOW 1 TABLET TO DISSOLVE ON TONGUE EVERY 8 HOURS AS NEEDED FOR NAUSEA AND VOMITING 15 tablet 0   • traZODone (DESYREL) 50 MG tablet TAKE 1 TABLET BY MOUTH EVERY NIGHT 30 tablet 3   • sodium-potassium-magnesium sulfates (SUPREP BOWEL PREP KIT) 17.5-3.13-1.6 GM/177ML solution oral solution Take 1 bottle by mouth Every 12 (Twelve) Hours. 2 bottle 0   • sucralfate (CARAFATE) 1 g tablet Take 1 tablet by mouth 4 (Four) Times a Day. 120 tablet 2     No current facility-administered medications for this visit.      Allergies   Allergen Reactions   • Azithromycin Nausea And Vomiting   • Penicillins GI Intolerance and Nausea Only     Social History     Social History   • Marital status: Single     Social History Main Topics   • Smoking status: Never Smoker   • Smokeless tobacco: Never Used   • Drug use: Unknown     Other Topics Concern   • Not on file       Review of Systems  Review of Systems   Constitutional: Positive for fatigue. Negative for activity change, appetite change, chills, diaphoresis, fever and unexpected weight change.   HENT: Negative for sore throat and trouble swallowing.    Respiratory: Negative for shortness of breath.   "  Gastrointestinal: Positive for abdominal pain, diarrhea (3 days now normal ), nausea and vomiting. Negative for abdominal distention, anal bleeding, blood in stool, constipation and rectal pain.   Musculoskeletal: Negative for arthralgias.   Skin: Negative for pallor.   Neurological: Negative for light-headedness.        BP 92/62 (BP Location: Left arm)   Pulse 112   Ht 152.4 cm (60\")   Wt 41.1 kg (90 lb 9.6 oz)   SpO2 99%   BMI 17.69 kg/m²     Objective    Physical Exam   Constitutional: She is oriented to person, place, and time. She appears well-developed and well-nourished. She is cooperative. No distress.   HENT:   Head: Normocephalic and atraumatic.   Neck: Normal range of motion. Neck supple. No thyromegaly present.   Cardiovascular: Normal rate, regular rhythm and normal heart sounds.    Pulmonary/Chest: Effort normal and breath sounds normal. She has no wheezes. She has no rhonchi. She has no rales.   Abdominal: Soft. Normal appearance and bowel sounds are normal. She exhibits no distension, no fluid wave and no ascites. There is no hepatosplenomegaly. There is tenderness in the right upper quadrant, epigastric area and left upper quadrant. There is no rigidity and no guarding. No hernia.   Lymphadenopathy:     She has no cervical adenopathy.   Neurological: She is alert and oriented to person, place, and time.   Skin: Skin is warm, dry and intact. No rash noted. No pallor.   Psychiatric: She has a normal mood and affect. Her speech is normal.     Lab on 09/19/2018   Component Date Value Ref Range Status   • Glucose 09/19/2018 81  60 - 100 mg/dL Final   • BUN 09/19/2018 6* 7 - 21 mg/dL Final   • Creatinine 09/19/2018 0.52  0.50 - 1.00 mg/dL Final   • Sodium 09/19/2018 139  137 - 145 mmol/L Final   • Potassium 09/19/2018 3.7  3.5 - 5.1 mmol/L Final   • Chloride 09/19/2018 102  95 - 110 mmol/L Final   • CO2 09/19/2018 26.0  22.0 - 31.0 mmol/L Final   • Calcium 09/19/2018 9.5  8.4 - 10.2 mg/dL Final   • " eGFR Non African Amer 09/19/2018 145  71 - 165 mL/min/1.73 Final   • BUN/Creatinine Ratio 09/19/2018 11.5  7.0 - 25.0 Final   • Anion Gap 09/19/2018 11.0  5.0 - 15.0 mmol/L Final     Assessment/Plan      1. Nausea    2. Weight loss, abnormal    3. Diarrhea, unspecified type    .       Orders placed during this encounter include:  Orders Placed This Encounter   Procedures   • Recurrent Gastrointestinal Distress     Standing Status:   Future     Number of Occurrences:   1     Standing Expiration Date:   11/2/2019   • Hepatic Function Panel   • CBC Auto Differential   • Scan Slide     Standing Status:   Future     Number of Occurrences:   1     Standing Expiration Date:   11/2/2019   • Follow Anesthesia Guidelines / Standing Orders     Standing Status:   Future   • CBC & Differential     Order Specific Question:   Manual Differential     Answer:   No       ESOPHAGOGASTRODUODENOSCOPY (N/A), COLONOSCOPY (N/A)    Review and/or summary of lab tests, radiology, procedures, medications. Review and summary of old records and obtaining of history. The risks and benefits of my recommendations, as well as other treatment options were discussed with the patient today. Questions were answered.    New Medications Ordered This Visit   Medications   • sodium-potassium-magnesium sulfates (SUPREP BOWEL PREP KIT) 17.5-3.13-1.6 GM/177ML solution oral solution     Sig: Take 1 bottle by mouth Every 12 (Twelve) Hours.     Dispense:  2 bottle     Refill:  0   • sucralfate (CARAFATE) 1 g tablet     Sig: Take 1 tablet by mouth 4 (Four) Times a Day.     Dispense:  120 tablet     Refill:  2       Follow-up: Return in about 4 weeks (around 11/30/2018).          This document has been electronically signed by DEIDRA Castro on November 2, 2018 1:21 PM             Results for orders placed or performed in visit on 11/02/18   CBC Auto Differential   Result Value Ref Range    WBC 7.40 3.20 - 9.80 10*3/mm3    RBC 4.35 3.77 - 5.16 10*6/mm3     Hemoglobin 14.4 12.0 - 15.5 g/dL    Hematocrit 39.3 35.0 - 45.0 %    MCV 90.3 80.0 - 98.0 fL    MCH 33.1 26.5 - 34.0 pg    MCHC 36.6 (H) 31.4 - 36.0 g/dL    RDW 12.4 11.5 - 14.5 %    RDW-SD 41.4 36.4 - 46.3 fl    MPV 9.1 8.0 - 12.0 fL    Platelets 383 150 - 450 10*3/mm3    Neutrophil % 66.1 37.0 - 80.0 %    Lymphocyte % 23.4 10.0 - 50.0 %    Monocyte % 7.8 0.0 - 12.0 %    Eosinophil % 1.8 0.0 - 7.0 %    Basophil % 0.5 0.0 - 2.0 %    Immature Grans % 0.4 0.0 - 0.5 %    Neutrophils, Absolute 4.89 2.00 - 8.60 10*3/mm3    Lymphocytes, Absolute 1.73 0.60 - 4.20 10*3/mm3    Monocytes, Absolute 0.58 0.00 - 0.90 10*3/mm3    Eosinophils, Absolute 0.13 0.00 - 0.70 10*3/mm3    Basophils, Absolute 0.04 0.00 - 0.20 10*3/mm3    Immature Grans, Absolute 0.03 (H) 0.00 - 0.02 10*3/mm3    nRBC 0.0 0.0 - 0.0 /100 WBC   Hepatic Function Panel   Result Value Ref Range    Total Protein 8.1 6.3 - 8.6 g/dL    Albumin 4.60 3.40 - 4.80 g/dL    ALT (SGPT) 40 9 - 52 U/L    AST (SGOT) 43 (H) 14 - 36 U/L    Alkaline Phosphatase 104 38 - 126 U/L    Total Bilirubin 1.7 (H) 0.2 - 1.3 mg/dL    Bilirubin, Direct 0.0 0.0 - 0.3 mg/dL    Bilirubin, Indirect 1.4 (H) 0.0 - 1.1 mg/dL   Results for orders placed or performed in visit on 09/19/18   Basic metabolic panel   Result Value Ref Range    Glucose 81 60 - 100 mg/dL    BUN 6 (L) 7 - 21 mg/dL    Creatinine 0.52 0.50 - 1.00 mg/dL    Sodium 139 137 - 145 mmol/L    Potassium 3.7 3.5 - 5.1 mmol/L    Chloride 102 95 - 110 mmol/L    CO2 26.0 22.0 - 31.0 mmol/L    Calcium 9.5 8.4 - 10.2 mg/dL    eGFR Non  Amer 145 71 - 165 mL/min/1.73    BUN/Creatinine Ratio 11.5 7.0 - 25.0    Anion Gap 11.0 5.0 - 15.0 mmol/L   Results for orders placed or performed in visit on 06/27/17   Allergens, Zone 5   Result Value Ref Range    Class Description Comment     D. pteronyssinus (dust mite) <0.10 Class 0 kU/L    D. farinae (dust mite) <0.10 Class 0 kU/L    Cat Dander 0.12 (A) Class 0/I kU/L    Dog Dander, IgE 0.13 (A)  Class 0/I kU/L    [place] Grass 0.96 (A) Class II kU/L    Rye, Perennial 1.31 (A) Class II kU/L    [place] Grass 0.91 (A) Class II kU/L    [place] Grass 1.18 (A) Class II kU/L    Cockroach, American <0.10 Class 0 kU/L    Penicillium chrysogen 1.31 (A) Class II kU/L    Cladosporium herbarum 0.31 (A) Class 0/I kU/L    Aspergillus fumigatus 0.41 (A) Class I kU/L    Mucor racemosus <0.10 Class 0 kU/L    Alternaria alternata 14.60 (A) Class IV kU/L    Stemphylium herbarum 3.59 (A) Class III kU/L    Elm, American 1.41 (A) Class III kU/L    Eucalyptus 1.69 (A) Class III kU/L    Maple/[place] 1.31 (A) Class II kU/L    [place], Italian 0.17 (A) Class 0/I kU/L    Pepper Tree 1.39 (A) Class II kU/L    [place], Live/[place] 1.49 (A) Class III kU/L    [place]et, Common 0.54 (A) Class I kU/L    Bayberry 1.40 (A) Class II kU/L    [place] Palm <0.10 Class 0 kU/L    Ragweed, Common/Short 0.11 (A) Class 0/I kU/L    English Plantain 1.32 (A) Class II kU/L    Lamb's Quarter 1.45 (A) Class III kU/L    Sheep Sorrel 1.34 (A) Class II kU/L    Dog Fennel <0.10 Class 0 kU/L   Results for orders placed or performed in visit on 11/02/16    PAP SMEAR   Result Value Ref Range     Pap smear Complete    Results for orders placed or performed during the hospital encounter of 09/04/15   Pregnancy, urine   Result Value Ref Range    HCG Urine, QL Negative    Urinalysis Without Microscopic   Result Value Ref Range    Color, UA YELLOW     Appearance CLOUDY     Specific [place], UA 1.020 1.003 - 1.030    pH, UA 7.5 pH Units    Leukocytes, UA NEGATIVE NEGATIVE    Nitrite, UA NEGATIVE NEGATIVE    Protein, UA TRACE (A) NEGATIVE    Glucose, Urine NEGATIVE NEGATIVE mg/dl    Ketones, UA NEGATIVE NEGATIVE    Urobilinogen, UA 1.0 (A) 0.2 EU/dl    Blood, UA NEGATIVE NEGATIVE   CBC and Differential   Result Value Ref Range    WBC 9.2 3.2 - 9.8 x1000/uL    RBC 4.27 3.77 - 5.16 sherri/mm3    Hemoglobin 11.1 (L) 12.0 - 15.5 gm/dl    Hematocrit 33.4 (L) 35.0 - 45.0 %    MCV  78.2 (L) 80.0 - 98.0 fl    MCH 26.0 26.0 - 34.0 pg    MCHC 33.2 31.4 - 36.0 gm/dl    RDW 14.2 11.5 - 14.5 %    Platelets 341 150 - 450 x1000/mm3    MPV 9.0 8.0 - 12.0 fl    Neutrophil Rel % 60.3 37.0 - 80.0 %    Lymphocyte Rel % 30.3 10.0 - 50.0 %    Monocyte Rel % 6.4 0.0 - 12.0 %    Eosinophil Rel % 2.3 0.0 - 7.0 %    Basophil Rel % 0.3 0.0 - 2.0 %    Immature Granulocyte Rel % 0.40 0.00 - 0.50 %    Neutrophils Absolute 5.52 2.00 - 8.60 x1000/uL    Lymphocytes Absolute 2.78 0.60 - 4.20 x1000/uL    Monocytes Absolute 0.59 0.00 - 0.90 x1000/uL    Eosinophils Absolute 0.21 0.00 - 0.70 x1000/uL    Basophils Absolute 0.03 0.00 - 0.20 x1000/uL    Immature Granulocytes Absolute 0.040 (H) 0.005 - 0.022 x1000/uL     *Note: Due to a large number of results and/or encounters for the requested time period, some results have not been displayed. A complete set of results can be found in Results Review.

## 2018-11-03 LAB
GLIADIN PEPTIDE IGA SER-ACNC: 33 UNITS (ref 0–19)
GLIADIN PEPTIDE IGG SER-ACNC: 10 UNITS (ref 0–19)
TTG IGA SER-ACNC: 5 U/ML (ref 0–3)
TTG IGG SER-ACNC: 5 U/ML (ref 0–5)

## 2018-11-05 ENCOUNTER — APPOINTMENT (OUTPATIENT)
Dept: ULTRASOUND IMAGING | Facility: HOSPITAL | Age: 23
End: 2018-11-05

## 2018-11-05 DIAGNOSIS — R11.0 NAUSEA: ICD-10-CM

## 2018-11-05 DIAGNOSIS — R19.7 DIARRHEA, UNSPECIFIED TYPE: ICD-10-CM

## 2018-11-05 DIAGNOSIS — R63.4 WEIGHT LOSS, ABNORMAL: ICD-10-CM

## 2018-11-05 LAB
CALIF WALNUT POLN IGE QN: 0.67 KU/L
CODFISH IGE QN: <0.1 KU/L
CONV CLASS DESCRIPTION: ABNORMAL
COW MILK IGE QN: 0.27 KU/L
EGG WHITE IGE QN: 0.9 KU/L
GLUTEN IGE QN: 0.25 KU/L
HAZELNUT IGE QN: 0.42 KU/L
PEANUT IGE QN: 0.97 KU/L
SCALLOP IGE QN: <0.1 KU/L
SESAME SEED IGE: 0.69 KU/L
SHRIMP IGE: <0.1 KU/L
SOYBEAN IGE QN: 0.27 KU/L
WHEAT IGE QN: 0.73 KU/L

## 2018-11-06 ENCOUNTER — TELEPHONE (OUTPATIENT)
Dept: GASTROENTEROLOGY | Facility: CLINIC | Age: 23
End: 2018-11-06

## 2018-11-06 RX ORDER — ONDANSETRON 8 MG/1
TABLET, ORALLY DISINTEGRATING ORAL
Qty: 15 TABLET | Refills: 0 | Status: SHIPPED | OUTPATIENT
Start: 2018-11-06 | End: 2019-01-30

## 2018-11-06 NOTE — TELEPHONE ENCOUNTER
Contacted patient regarding us and lab results. Patient voiced understanding, also mailed copy of lab results to patient.

## 2018-11-08 DIAGNOSIS — Z91.018 MULTIPLE FOOD ALLERGIES: Primary | ICD-10-CM

## 2018-11-15 ENCOUNTER — HOSPITAL ENCOUNTER (OUTPATIENT)
Facility: HOSPITAL | Age: 23
Setting detail: HOSPITAL OUTPATIENT SURGERY
Discharge: HOME | End: 2018-11-15
Attending: INTERNAL MEDICINE | Admitting: INTERNAL MEDICINE

## 2018-11-15 ENCOUNTER — ANESTHESIA EVENT (OUTPATIENT)
Dept: GASTROENTEROLOGY | Facility: HOSPITAL | Age: 23
End: 2018-11-15

## 2018-11-15 ENCOUNTER — ANESTHESIA (OUTPATIENT)
Dept: GASTROENTEROLOGY | Facility: HOSPITAL | Age: 23
End: 2018-11-15

## 2018-11-15 VITALS
DIASTOLIC BLOOD PRESSURE: 54 MMHG | TEMPERATURE: 97.5 F | WEIGHT: 84.66 LBS | RESPIRATION RATE: 18 BRPM | HEIGHT: 60 IN | BODY MASS INDEX: 16.62 KG/M2 | HEART RATE: 82 BPM | SYSTOLIC BLOOD PRESSURE: 89 MMHG

## 2018-11-15 DIAGNOSIS — R11.0 NAUSEA: ICD-10-CM

## 2018-11-15 DIAGNOSIS — R19.7 DIARRHEA, UNSPECIFIED TYPE: ICD-10-CM

## 2018-11-15 DIAGNOSIS — R63.4 WEIGHT LOSS, ABNORMAL: ICD-10-CM

## 2018-11-15 LAB
B-HCG UR QL: NEGATIVE
KOH PREP NAIL: ABNORMAL

## 2018-11-15 PROCEDURE — 81025 URINE PREGNANCY TEST: CPT | Performed by: INTERNAL MEDICINE

## 2018-11-15 PROCEDURE — 25010000002 PROPOFOL 10 MG/ML EMULSION: Performed by: NURSE ANESTHETIST, CERTIFIED REGISTERED

## 2018-11-15 PROCEDURE — 43239 EGD BIOPSY SINGLE/MULTIPLE: CPT | Performed by: INTERNAL MEDICINE

## 2018-11-15 PROCEDURE — 45378 DIAGNOSTIC COLONOSCOPY: CPT | Performed by: INTERNAL MEDICINE

## 2018-11-15 PROCEDURE — 88305 TISSUE EXAM BY PATHOLOGIST: CPT | Performed by: PATHOLOGY

## 2018-11-15 PROCEDURE — 88305 TISSUE EXAM BY PATHOLOGIST: CPT | Performed by: INTERNAL MEDICINE

## 2018-11-15 PROCEDURE — 87220 TISSUE EXAM FOR FUNGI: CPT | Performed by: INTERNAL MEDICINE

## 2018-11-15 PROCEDURE — 88342 IMHCHEM/IMCYTCHM 1ST ANTB: CPT | Performed by: INTERNAL MEDICINE

## 2018-11-15 RX ORDER — PROPOFOL 10 MG/ML
VIAL (ML) INTRAVENOUS AS NEEDED
Status: DISCONTINUED | OUTPATIENT
Start: 2018-11-15 | End: 2018-11-15 | Stop reason: SURG

## 2018-11-15 RX ORDER — PANTOPRAZOLE SODIUM 40 MG/1
40 TABLET, DELAYED RELEASE ORAL DAILY
Qty: 30 TABLET | Refills: 3 | Status: SHIPPED | OUTPATIENT
Start: 2018-11-15 | End: 2019-03-27 | Stop reason: SDUPTHER

## 2018-11-15 RX ORDER — ONDANSETRON 2 MG/ML
4 INJECTION INTRAMUSCULAR; INTRAVENOUS ONCE AS NEEDED
Status: DISCONTINUED | OUTPATIENT
Start: 2018-11-15 | End: 2018-11-15 | Stop reason: HOSPADM

## 2018-11-15 RX ORDER — DEXTROSE AND SODIUM CHLORIDE 5; .45 G/100ML; G/100ML
30 INJECTION, SOLUTION INTRAVENOUS CONTINUOUS PRN
Status: DISCONTINUED | OUTPATIENT
Start: 2018-11-15 | End: 2018-11-15 | Stop reason: HOSPADM

## 2018-11-15 RX ORDER — LIDOCAINE HYDROCHLORIDE 20 MG/ML
INJECTION, SOLUTION INFILTRATION; PERINEURAL AS NEEDED
Status: DISCONTINUED | OUTPATIENT
Start: 2018-11-15 | End: 2018-11-15 | Stop reason: SURG

## 2018-11-15 RX ORDER — FLUCONAZOLE 100 MG/1
100 TABLET ORAL DAILY
Qty: 22 TABLET | Refills: 0 | Status: SHIPPED | OUTPATIENT
Start: 2018-11-15 | End: 2019-01-30

## 2018-11-15 RX ADMIN — PROPOFOL 20 MG: 10 INJECTION, EMULSION INTRAVENOUS at 15:03

## 2018-11-15 RX ADMIN — PROPOFOL 20 MG: 10 INJECTION, EMULSION INTRAVENOUS at 15:05

## 2018-11-15 RX ADMIN — PROPOFOL 20 MG: 10 INJECTION, EMULSION INTRAVENOUS at 15:17

## 2018-11-15 RX ADMIN — PROPOFOL 20 MG: 10 INJECTION, EMULSION INTRAVENOUS at 15:09

## 2018-11-15 RX ADMIN — PROPOFOL 20 MG: 10 INJECTION, EMULSION INTRAVENOUS at 14:57

## 2018-11-15 RX ADMIN — LIDOCAINE HYDROCHLORIDE 80 MG: 20 INJECTION, SOLUTION INFILTRATION; PERINEURAL at 14:54

## 2018-11-15 RX ADMIN — PROPOFOL 20 MG: 10 INJECTION, EMULSION INTRAVENOUS at 15:15

## 2018-11-15 RX ADMIN — PROPOFOL 20 MG: 10 INJECTION, EMULSION INTRAVENOUS at 15:10

## 2018-11-15 RX ADMIN — DEXTROSE AND SODIUM CHLORIDE 30 ML/HR: 5; 450 INJECTION, SOLUTION INTRAVENOUS at 13:24

## 2018-11-15 RX ADMIN — PROPOFOL 20 MG: 10 INJECTION, EMULSION INTRAVENOUS at 15:04

## 2018-11-15 RX ADMIN — PROPOFOL 20 MG: 10 INJECTION, EMULSION INTRAVENOUS at 15:01

## 2018-11-15 RX ADMIN — PROPOFOL 20 MG: 10 INJECTION, EMULSION INTRAVENOUS at 14:59

## 2018-11-15 RX ADMIN — PROPOFOL 20 MG: 10 INJECTION, EMULSION INTRAVENOUS at 15:11

## 2018-11-15 RX ADMIN — PROPOFOL 20 MG: 10 INJECTION, EMULSION INTRAVENOUS at 14:58

## 2018-11-15 RX ADMIN — PROPOFOL 20 MG: 10 INJECTION, EMULSION INTRAVENOUS at 15:06

## 2018-11-15 RX ADMIN — PROPOFOL 100 MG: 10 INJECTION, EMULSION INTRAVENOUS at 14:54

## 2018-11-15 RX ADMIN — PROPOFOL 30 MG: 10 INJECTION, EMULSION INTRAVENOUS at 14:56

## 2018-11-15 RX ADMIN — PROPOFOL 20 MG: 10 INJECTION, EMULSION INTRAVENOUS at 15:07

## 2018-11-15 RX ADMIN — PROPOFOL 20 MG: 10 INJECTION, EMULSION INTRAVENOUS at 15:08

## 2018-11-15 RX ADMIN — PROPOFOL 30 MG: 10 INJECTION, EMULSION INTRAVENOUS at 14:55

## 2018-11-15 RX ADMIN — PROPOFOL 20 MG: 10 INJECTION, EMULSION INTRAVENOUS at 15:13

## 2018-11-15 NOTE — ANESTHESIA PREPROCEDURE EVALUATION
Anesthesia Evaluation     Patient summary reviewed   NPO Solid Status: > 8 hours  NPO Liquid Status: > 2 hours           Airway   Mallampati: I  TM distance: >3 FB  Dental - normal exam     Pulmonary - normal exam   (+) asthma,   Cardiovascular - negative cardio ROS and normal exam  Exercise tolerance: excellent (>7 METS)        Neuro/Psych- negative ROS  GI/Hepatic/Renal/Endo - negative ROS     Musculoskeletal (-) negative ROS    Abdominal  - normal exam   Substance History - negative use     OB/GYN negative ob/gyn ROS   (-)  Pregnant        Other - negative ROS                     Anesthesia Plan    ASA 2     MAC     intravenous induction   Anesthetic plan, all risks, benefits, and alternatives have been provided, discussed and informed consent has been obtained with: patient.

## 2018-11-19 LAB
LAB AP CASE REPORT: NORMAL
LAB AP DIAGNOSIS COMMENT: NORMAL
PATH REPORT.FINAL DX SPEC: NORMAL
PATH REPORT.GROSS SPEC: NORMAL

## 2018-11-27 ENCOUNTER — OFFICE VISIT (OUTPATIENT)
Dept: GASTROENTEROLOGY | Facility: CLINIC | Age: 23
End: 2018-11-27

## 2018-11-27 ENCOUNTER — APPOINTMENT (OUTPATIENT)
Dept: LAB | Facility: HOSPITAL | Age: 23
End: 2018-11-27

## 2018-11-27 VITALS
HEIGHT: 60 IN | OXYGEN SATURATION: 99 % | WEIGHT: 86.2 LBS | BODY MASS INDEX: 16.92 KG/M2 | DIASTOLIC BLOOD PRESSURE: 62 MMHG | SYSTOLIC BLOOD PRESSURE: 102 MMHG | HEART RATE: 101 BPM

## 2018-11-27 DIAGNOSIS — R79.89 ELEVATED LFTS: ICD-10-CM

## 2018-11-27 DIAGNOSIS — B37.81 CANDIDAL ESOPHAGITIS (HCC): ICD-10-CM

## 2018-11-27 DIAGNOSIS — K29.00 ACUTE GASTRITIS WITHOUT HEMORRHAGE, UNSPECIFIED GASTRITIS TYPE: ICD-10-CM

## 2018-11-27 DIAGNOSIS — K21.9 GASTROESOPHAGEAL REFLUX DISEASE WITHOUT ESOPHAGITIS: Primary | ICD-10-CM

## 2018-11-27 DIAGNOSIS — K52.29 GASTROINTESTINAL FOOD ALLERGY SYNDROME: ICD-10-CM

## 2018-11-27 LAB
ALBUMIN SERPL-MCNC: 4.9 G/DL (ref 3.4–4.8)
ALP SERPL-CCNC: 92 U/L (ref 38–126)
ALT SERPL W P-5'-P-CCNC: 47 U/L (ref 9–52)
AST SERPL-CCNC: 46 U/L (ref 14–36)
BILIRUB CONJ SERPL-MCNC: 0 MG/DL (ref 0–0.3)
BILIRUB INDIRECT SERPL-MCNC: 0.6 MG/DL (ref 0–1.1)
BILIRUB SERPL-MCNC: 0.9 MG/DL (ref 0.2–1.3)
PROT SERPL-MCNC: 8 G/DL (ref 6.3–8.6)

## 2018-11-27 PROCEDURE — 36415 COLL VENOUS BLD VENIPUNCTURE: CPT | Performed by: NURSE PRACTITIONER

## 2018-11-27 PROCEDURE — 99213 OFFICE O/P EST LOW 20 MIN: CPT | Performed by: NURSE PRACTITIONER

## 2018-11-27 PROCEDURE — 80076 HEPATIC FUNCTION PANEL: CPT | Performed by: NURSE PRACTITIONER

## 2018-11-28 ENCOUNTER — TELEPHONE (OUTPATIENT)
Dept: GASTROENTEROLOGY | Facility: CLINIC | Age: 23
End: 2018-11-28

## 2018-11-28 NOTE — TELEPHONE ENCOUNTER
Contacted patient regarding results of hepatic functino panel.Relayed results and recommendations per Fatou Lin NP. Patient voiced understanding.

## 2018-12-06 NOTE — PATIENT INSTRUCTIONS
Gastritis, Adult  Gastritis is inflammation of the stomach. There are two kinds of gastritis:  · Acute gastritis. This kind develops suddenly.  · Chronic gastritis. This kind lasts for a long time.    Gastritis happens when the lining of the stomach becomes weak or gets damaged. Without treatment, gastritis can lead to stomach bleeding and ulcers.  What are the causes?  This condition may be caused by:  · An infection.  · Drinking too much alcohol.  · Certain medicines.  · Having too much acid in the stomach.  · A disease of the intestines or stomach.  · Stress.    What are the signs or symptoms?  Symptoms of this condition include:  · Pain or a burning in the upper abdomen.  · Nausea.  · Vomiting.  · An uncomfortable feeling of fullness after eating.    In some cases, there are no symptoms.  How is this diagnosed?  This condition may be diagnosed with:  · A description of your symptoms.  · A physical exam.  · Tests. These can include:  ? Blood tests.  ? Stool tests.  ? A test in which a thin, flexible instrument with a light and camera on the end is passed down the esophagus and into the stomach (upper endoscopy).  ? A test in which a sample of tissue is taken for testing (biopsy).    How is this treated?  This condition may be treated with medicines. If the condition is caused by a bacterial infection, you may be given antibiotic medicines. If it is caused by too much acid in the stomach, you may get medicines called H2 blockers, proton pump inhibitors, or antacids. Treatment may also involve stopping the use of certain medicines, such as aspirin, ibuprofen, or other nonsteroidal anti-inflammatory drugs (NSAIDs).  Follow these instructions at home:  · Take over-the-counter and prescription medicines only as told by your health care provider.  · If you were prescribed an antibiotic, take it as told by your health care provider. Do not stop taking the antibiotic even if you start to feel better.  · Drink enough  fluid to keep your urine clear or pale yellow.  · Eat small, frequent meals instead of large meals.  Contact a health care provider if:  · Your symptoms get worse.  · Your symptoms return after treatment.  Get help right away if:  · You vomit blood or material that looks like coffee grounds.  · You have black or dark red stools.  · You are unable to keep fluids down.  · Your abdominal pain gets worse.  · You have a fever.  · You do not feel better after 1 week.  This information is not intended to replace advice given to you by your health care provider. Make sure you discuss any questions you have with your health care provider.  Document Released: 12/12/2002 Document Revised: 08/16/2017 Document Reviewed: 09/10/2016  [employer] Patient Education © 2018 [employer]

## 2019-01-09 RX ORDER — TRAZODONE HYDROCHLORIDE 50 MG/1
50 TABLET ORAL NIGHTLY
Qty: 30 TABLET | Refills: 0 | OUTPATIENT
Start: 2019-01-09

## 2019-01-22 ENCOUNTER — TELEPHONE (OUTPATIENT)
Dept: GASTROENTEROLOGY | Facility: CLINIC | Age: 24
End: 2019-01-22

## 2019-01-22 NOTE — TELEPHONE ENCOUNTER
Contacted patient as well as [facility] to notify them that the pantoprazole PA was approved.

## 2019-01-30 ENCOUNTER — OFFICE VISIT (OUTPATIENT)
Dept: FAMILY MEDICINE CLINIC | Facility: CLINIC | Age: 24
End: 2019-01-30

## 2019-01-30 ENCOUNTER — LAB (OUTPATIENT)
Dept: LAB | Facility: HOSPITAL | Age: 24
End: 2019-01-30

## 2019-01-30 VITALS
WEIGHT: 91 LBS | OXYGEN SATURATION: 99 % | HEART RATE: 87 BPM | DIASTOLIC BLOOD PRESSURE: 60 MMHG | BODY MASS INDEX: 17.87 KG/M2 | SYSTOLIC BLOOD PRESSURE: 100 MMHG | HEIGHT: 60 IN

## 2019-01-30 DIAGNOSIS — G47.00 INSOMNIA, UNSPECIFIED TYPE: ICD-10-CM

## 2019-01-30 DIAGNOSIS — K29.70 GASTRITIS WITHOUT BLEEDING, UNSPECIFIED CHRONICITY, UNSPECIFIED GASTRITIS TYPE: Primary | ICD-10-CM

## 2019-01-30 DIAGNOSIS — J45.909 ASTHMA, UNSPECIFIED ASTHMA SEVERITY, UNSPECIFIED WHETHER COMPLICATED, UNSPECIFIED WHETHER PERSISTENT: ICD-10-CM

## 2019-01-30 LAB
ALBUMIN SERPL-MCNC: 4.7 G/DL (ref 3.4–4.8)
ALBUMIN/GLOB SERPL: 1.7 G/DL (ref 1.1–1.8)
ALP SERPL-CCNC: 110 U/L (ref 38–126)
ALT SERPL W P-5'-P-CCNC: 87 U/L (ref 9–52)
ANION GAP SERPL CALCULATED.3IONS-SCNC: 8 MMOL/L (ref 5–15)
AST SERPL-CCNC: 77 U/L (ref 14–36)
BASOPHILS # BLD AUTO: 0.04 10*3/MM3 (ref 0–0.2)
BASOPHILS NFR BLD AUTO: 0.6 % (ref 0–2)
BILIRUB SERPL-MCNC: 0.7 MG/DL (ref 0.2–1.3)
BUN BLD-MCNC: 9 MG/DL (ref 7–21)
BUN/CREAT SERPL: 13.2 (ref 7–25)
CALCIUM SPEC-SCNC: 9.6 MG/DL (ref 8.4–10.2)
CHLORIDE SERPL-SCNC: 99 MMOL/L (ref 95–110)
CO2 SERPL-SCNC: 28 MMOL/L (ref 22–31)
CREAT BLD-MCNC: 0.68 MG/DL (ref 0.5–1)
DEPRECATED RDW RBC AUTO: 40 FL (ref 36.4–46.3)
EOSINOPHIL # BLD AUTO: 0.22 10*3/MM3 (ref 0–0.7)
EOSINOPHIL NFR BLD AUTO: 3.2 % (ref 0–7)
ERYTHROCYTE [DISTWIDTH] IN BLOOD BY AUTOMATED COUNT: 11.9 % (ref 11.5–14.5)
GFR SERPL CREATININE-BSD FRML MDRD: 105 ML/MIN/1.73 (ref 71–165)
GLOBULIN UR ELPH-MCNC: 2.8 GM/DL (ref 2.3–3.5)
GLUCOSE BLD-MCNC: 93 MG/DL (ref 60–100)
HCT VFR BLD AUTO: 39.2 % (ref 35–45)
HGB BLD-MCNC: 14.2 G/DL (ref 12–15.5)
IMM GRANULOCYTES # BLD AUTO: 0.04 10*3/MM3 (ref 0–0.02)
IMM GRANULOCYTES NFR BLD AUTO: 0.6 % (ref 0–0.5)
LYMPHOCYTES # BLD AUTO: 2.7 10*3/MM3 (ref 0.6–4.2)
LYMPHOCYTES NFR BLD AUTO: 39 % (ref 10–50)
MCH RBC QN AUTO: 33.1 PG (ref 26.5–34)
MCHC RBC AUTO-ENTMCNC: 36.2 G/DL (ref 31.4–36)
MCV RBC AUTO: 91.4 FL (ref 80–98)
MONOCYTES # BLD AUTO: 0.6 10*3/MM3 (ref 0–0.9)
MONOCYTES NFR BLD AUTO: 8.7 % (ref 0–12)
NEUTROPHILS # BLD AUTO: 3.33 10*3/MM3 (ref 2–8.6)
NEUTROPHILS NFR BLD AUTO: 47.9 % (ref 37–80)
PLATELET # BLD AUTO: 357 10*3/MM3 (ref 150–450)
PMV BLD AUTO: 9.4 FL (ref 8–12)
POTASSIUM BLD-SCNC: 3.9 MMOL/L (ref 3.5–5.1)
PROT SERPL-MCNC: 7.5 G/DL (ref 6.3–8.6)
RBC # BLD AUTO: 4.29 10*6/MM3 (ref 3.77–5.16)
SODIUM BLD-SCNC: 135 MMOL/L (ref 137–145)
WBC NRBC COR # BLD: 6.93 10*3/MM3 (ref 3.2–9.8)

## 2019-01-30 PROCEDURE — 85025 COMPLETE CBC W/AUTO DIFF WBC: CPT

## 2019-01-30 PROCEDURE — 80053 COMPREHEN METABOLIC PANEL: CPT

## 2019-01-30 PROCEDURE — 36415 COLL VENOUS BLD VENIPUNCTURE: CPT

## 2019-01-30 PROCEDURE — 99214 OFFICE O/P EST MOD 30 MIN: CPT | Performed by: FAMILY MEDICINE

## 2019-01-30 RX ORDER — TRAZODONE HYDROCHLORIDE 50 MG/1
50 TABLET ORAL NIGHTLY
Qty: 90 TABLET | Refills: 1 | Status: SHIPPED | OUTPATIENT
Start: 2019-01-30 | End: 2019-09-08 | Stop reason: SDUPTHER

## 2019-01-30 NOTE — PROGRESS NOTES
Subjective   Arlette Zaidi is a 24 y.o. female.    cc: follow up  History of Present Illness The patient comes in for check of their chronic medical issues which include Asthma,Gastritis and Insomnia. She needs refillw of her medications. .      The following portions of the patient's history were reviewed and updated as appropriate: allergies, current medications, past family history, past medical history, past social history, past surgical history and problem list.    Review of Systems   Constitutional: Negative for fatigue and fever.   Respiratory: Negative for cough, chest tightness and stridor.    Cardiovascular: Negative for chest pain, palpitations and leg swelling.       Objective   Physical Exam   Constitutional: She is oriented to person, place, and time. She appears well-developed and well-nourished.   HENT:   Head: Normocephalic and atraumatic.   Right Ear: External ear normal.   Left Ear: External ear normal.   Nose: Nose normal.   Mouth/Throat: Oropharynx is clear and moist.   Eyes: Pupils are equal, round, and reactive to light.   Neck: Normal range of motion.   Cardiovascular: Normal rate, regular rhythm and normal heart sounds. Exam reveals no gallop and no friction rub.   No murmur heard.  Pulmonary/Chest: Effort normal and breath sounds normal. No stridor. No respiratory distress. She has no wheezes. She has no rales.   Abdominal: Soft. Bowel sounds are normal. She exhibits no distension and no mass. There is no tenderness. There is no guarding.   Neurological: She is alert and oriented to person, place, and time.   Skin: Skin is warm and dry.   Vitals reviewed.        Assessment/Plan   Arlette was seen today for follow-up.    Diagnoses and all orders for this visit:    Gastritis without bleeding, unspecified chronicity, unspecified gastritis type    Insomnia, unspecified type    Asthma, unspecified asthma severity, unspecified whether complicated, unspecified whether persistent  -      Comprehensive metabolic panel; Future  -     CBC w AUTO Differential; Future    Other orders  -     traZODone (DESYREL) 50 MG tablet; Take 1 tablet by mouth Every Night.        Return to the clinic in 6 month/s.  Will contact with results as needed.

## 2019-02-25 ENCOUNTER — TELEPHONE (OUTPATIENT)
Dept: FAMILY MEDICINE CLINIC | Facility: CLINIC | Age: 24
End: 2019-02-25

## 2019-02-25 DIAGNOSIS — R79.89 ABNORMAL LIVER FUNCTION TEST: Primary | ICD-10-CM

## 2019-02-25 NOTE — TELEPHONE ENCOUNTER
ASIM ROCHA RECEIVED A LETTER IN THE MAIL SHE NEEDED TO GET HER LABS REPEATED IN 2WKS..PLEASE PUT ORDERS IN AND LET HER KNOW  [phone]

## 2019-03-01 ENCOUNTER — OFFICE VISIT (OUTPATIENT)
Dept: GASTROENTEROLOGY | Facility: CLINIC | Age: 24
End: 2019-03-01

## 2019-03-01 ENCOUNTER — LAB (OUTPATIENT)
Dept: LAB | Facility: HOSPITAL | Age: 24
End: 2019-03-01

## 2019-03-01 VITALS
HEART RATE: 97 BPM | DIASTOLIC BLOOD PRESSURE: 62 MMHG | WEIGHT: 89.8 LBS | SYSTOLIC BLOOD PRESSURE: 104 MMHG | BODY MASS INDEX: 17.63 KG/M2 | HEIGHT: 60 IN

## 2019-03-01 DIAGNOSIS — R79.89 ABNORMAL LIVER FUNCTION TEST: ICD-10-CM

## 2019-03-01 DIAGNOSIS — K29.50 CHRONIC GASTRITIS WITHOUT BLEEDING, UNSPECIFIED GASTRITIS TYPE: ICD-10-CM

## 2019-03-01 DIAGNOSIS — R19.7 DIARRHEA, UNSPECIFIED TYPE: Primary | ICD-10-CM

## 2019-03-01 DIAGNOSIS — R79.89 ELEVATED LFTS: ICD-10-CM

## 2019-03-01 LAB
ALBUMIN SERPL-MCNC: 4.3 G/DL (ref 3.4–4.8)
ALBUMIN/GLOB SERPL: 1.3 G/DL (ref 1.1–1.8)
ALP SERPL-CCNC: 102 U/L (ref 38–126)
ALT SERPL W P-5'-P-CCNC: 29 U/L (ref 9–52)
ANION GAP SERPL CALCULATED.3IONS-SCNC: 10 MMOL/L (ref 5–15)
AST SERPL-CCNC: 55 U/L (ref 14–36)
BASOPHILS # BLD AUTO: 0.05 10*3/MM3 (ref 0–0.2)
BASOPHILS NFR BLD AUTO: 0.9 % (ref 0–1.5)
BILIRUB SERPL-MCNC: 0.9 MG/DL (ref 0.2–1.3)
BUN BLD-MCNC: 8 MG/DL (ref 7–21)
BUN/CREAT SERPL: 14 (ref 7–25)
CALCIUM SPEC-SCNC: 9.3 MG/DL (ref 8.4–10.2)
CHLORIDE SERPL-SCNC: 100 MMOL/L (ref 95–110)
CO2 SERPL-SCNC: 28 MMOL/L (ref 22–31)
CREAT BLD-MCNC: 0.57 MG/DL (ref 0.5–1)
DEPRECATED RDW RBC AUTO: 39.7 FL (ref 37–54)
EOSINOPHIL # BLD AUTO: 0.14 10*3/MM3 (ref 0–0.4)
EOSINOPHIL NFR BLD AUTO: 2.4 % (ref 0.3–6.2)
ERYTHROCYTE [DISTWIDTH] IN BLOOD BY AUTOMATED COUNT: 11.8 % (ref 12.3–15.4)
GFR SERPL CREATININE-BSD FRML MDRD: 129 ML/MIN/1.73 (ref 71–165)
GLOBULIN UR ELPH-MCNC: 3.3 GM/DL (ref 2.3–3.5)
GLUCOSE BLD-MCNC: 81 MG/DL (ref 60–100)
HCT VFR BLD AUTO: 37.5 % (ref 34–46.6)
HGB BLD-MCNC: 13.5 G/DL (ref 12–15.9)
IMM GRANULOCYTES # BLD AUTO: 0.03 10*3/MM3 (ref 0–0.05)
IMM GRANULOCYTES NFR BLD AUTO: 0.5 % (ref 0–0.5)
LYMPHOCYTES # BLD AUTO: 2.03 10*3/MM3 (ref 0.7–3.1)
LYMPHOCYTES NFR BLD AUTO: 35.5 % (ref 19.6–45.3)
MCH RBC QN AUTO: 33.2 PG (ref 26.6–33)
MCHC RBC AUTO-ENTMCNC: 36 G/DL (ref 31.5–35.7)
MCV RBC AUTO: 92.1 FL (ref 79–97)
MONOCYTES # BLD AUTO: 0.4 10*3/MM3 (ref 0.1–0.9)
MONOCYTES NFR BLD AUTO: 7 % (ref 5–12)
NEUTROPHILS # BLD AUTO: 3.07 10*3/MM3 (ref 1.4–7)
NEUTROPHILS NFR BLD AUTO: 53.7 % (ref 42.7–76)
NRBC BLD AUTO-RTO: 0 /100 WBC (ref 0–0)
PLATELET # BLD AUTO: 385 10*3/MM3 (ref 140–450)
PMV BLD AUTO: 9.7 FL (ref 6–12)
POTASSIUM BLD-SCNC: 4.1 MMOL/L (ref 3.5–5.1)
PROT SERPL-MCNC: 7.6 G/DL (ref 6.3–8.6)
RBC # BLD AUTO: 4.07 10*6/MM3 (ref 3.77–5.28)
SODIUM BLD-SCNC: 138 MMOL/L (ref 137–145)
WBC NRBC COR # BLD: 5.72 10*3/MM3 (ref 3.4–10.8)

## 2019-03-01 PROCEDURE — 85025 COMPLETE CBC W/AUTO DIFF WBC: CPT

## 2019-03-01 PROCEDURE — 99213 OFFICE O/P EST LOW 20 MIN: CPT | Performed by: NURSE PRACTITIONER

## 2019-03-01 PROCEDURE — 80053 COMPREHEN METABOLIC PANEL: CPT

## 2019-03-01 PROCEDURE — 36415 COLL VENOUS BLD VENIPUNCTURE: CPT

## 2019-03-01 NOTE — PROGRESS NOTES
Chief Complaint   Patient presents with   • Abdominal Pain   • Diarrhea       Subjective    Arlette Zaidi is a 24 y.o. female. she is here today for follow-up.    History of Present Illness  24-year-old female presents for follow-up regarding abdominal pain diarrhea and weight loss.  Her weight is up 3 pounds from last office visit states she has done very well has only had about 2 episodes of diarrhea since last office visit denies any current abdominal pain.  She is followed up with allergist and obtain skin testing.  She is starting to reintroduce previous allergens.  Denies any current nausea vomiting or abdominal pain.  Reflux has been well controlled on Protonix and Carafate.  Routine lab work noted elevation of liver enzymes again.  She has lab work to be collected today.  Discussed with patient if those are persistently elevated will order labs to rule out autoimmune hepatitis  Plan; continue current regimen.  Follow-up in 6 months for recheck.  Return to office sooner if needed.       The following portions of the patient's history were reviewed and updated as appropriate:   Past Medical History:   Diagnosis Date   • Acute laryngitis    • Allergic rhinitis due to pollen    • Asthma     Asthmatic tendency      • Cough    • Cyst of ovary    • Encounter for initial prescription of contraceptive pills    • Encounter for surveillance of contraceptive pills    • Health maintenance examination    • Nonspecific reaction to tuberculin skin test without active tuberculosis     Negatiave   • Underimmunization status    • Upper respiratory infection    • Vaccination required    • Visit for gynecologic examination    • Well child visit      Past Surgical History:   Procedure Laterality Date   • CHOLECYSTECTOMY     • COLONOSCOPY N/A 11/15/2018    Procedure: COLONOSCOPY;  Surgeon: Jaiyeola, Aderemi, MD;  Location: [facility] ENDOSCOPY;  Service: Gastroenterology   • ENDOSCOPY N/A 11/15/2018    Procedure:  ESOPHAGOGASTRODUODENOSCOPY;  Surgeon: Jaiyeola, Aderemi, MD;  Location: [facility] ENDOSCOPY;  Service: Gastroenterology   • INJECTION OF MEDICATION  04/17/2015    Celestone (betamethasone) (3)    • INJECTION OF MEDICATION  01/18/2016    Depo Medrol (Methylprednisone) (9)      Family History   Problem Relation Age of Onset   • Ulcerative colitis Maternal Uncle    • Ulcerative colitis Maternal Grandmother      OB History     No data available        Prior to Admission medications    Medication Sig Start Date End Date Taking? Authorizing Provider   fexofenadine (ALLEGRA) 180 MG tablet Take 180 mg by mouth Daily.   Yes ProviderMagali MD   fluticasone (FLONASE) 50 MCG/ACT nasal spray 1 spray into each nostril.   Yes Magali Cortez MD   montelukast (SINGULAIR) 10 MG tablet TAKE 1 TABLET BY MOUTH EVERY NIGHT AT BEDTIME 12/4/17  Yes Ronnie Warren MD   pantoprazole (PROTONIX) 40 MG EC tablet Take 1 tablet by mouth Daily. 11/15/18  Yes Jaiyeola, Aderemi, MD   sucralfate (CARAFATE) 1 g tablet Take 1 tablet by mouth 4 (Four) Times a Day. 11/2/18  Yes Fatou Lin APRN   traZODone (DESYREL) 50 MG tablet Take 1 tablet by mouth Every Night. 1/30/19  Yes Michael Nassar MD     Allergies   Allergen Reactions   • Azithromycin Nausea And Vomiting   • Penicillins GI Intolerance and Nausea Only     Social History     Socioeconomic History   • Marital status: Single     Spouse name: Not on file   • Number of children: Not on file   • Years of education: Not on file   • Highest education level: Not on file   Tobacco Use   • Smoking status: Never Smoker   • Smokeless tobacco: Never Used   Substance and Sexual Activity   • Alcohol use: No   • Drug use: Defer   • Sexual activity: Defer       Review of Systems  Review of Systems   Constitutional: Negative for activity change, appetite change, chills, diaphoresis, fatigue, fever and unexpected weight change.   HENT: Negative for sore throat and trouble swallowing.   "  Respiratory: Negative for shortness of breath.    Gastrointestinal: Positive for abdominal pain and diarrhea. Negative for abdominal distention, anal bleeding, blood in stool, constipation, nausea, rectal pain and vomiting.   Musculoskeletal: Negative for arthralgias.   Skin: Negative for pallor.   Neurological: Negative for light-headedness.        /62 (BP Location: Left arm)   Pulse 97   Ht 152.4 cm (60\")   Wt 40.7 kg (89 lb 12.8 oz)   BMI 17.54 kg/m²     Objective    Physical Exam   Constitutional: She is oriented to person, place, and time. She appears well-developed and well-nourished. She is cooperative. No distress.   HENT:   Head: Normocephalic and atraumatic.   Neck: Normal range of motion. Neck supple. No thyromegaly present.   Cardiovascular: Normal rate, regular rhythm and normal heart sounds.   Pulmonary/Chest: Effort normal and breath sounds normal. She has no wheezes. She has no rhonchi. She has no rales.   Abdominal: Soft. Normal appearance and bowel sounds are normal. She exhibits no distension. There is no hepatosplenomegaly. There is no tenderness. There is no rigidity and no guarding. No hernia.   Lymphadenopathy:     She has no cervical adenopathy.   Neurological: She is alert and oriented to person, place, and time.   Skin: Skin is warm, dry and intact. No rash noted. No pallor.   Psychiatric: She has a normal mood and affect. Her speech is normal.     Lab on 01/30/2019   Component Date Value Ref Range Status   • Glucose 01/30/2019 93  60 - 100 mg/dL Final   • BUN 01/30/2019 9  7 - 21 mg/dL Final   • Creatinine 01/30/2019 0.68  0.50 - 1.00 mg/dL Final   • Sodium 01/30/2019 135* 137 - 145 mmol/L Final   • Potassium 01/30/2019 3.9  3.5 - 5.1 mmol/L Final   • Chloride 01/30/2019 99  95 - 110 mmol/L Final   • CO2 01/30/2019 28.0  22.0 - 31.0 mmol/L Final   • Calcium 01/30/2019 9.6  8.4 - 10.2 mg/dL Final   • Total Protein 01/30/2019 7.5  6.3 - 8.6 g/dL Final   • Albumin 01/30/2019 4.70  " 3.40 - 4.80 g/dL Final   • ALT (SGPT) 01/30/2019 87* 9 - 52 U/L Final   • AST (SGOT) 01/30/2019 77* 14 - 36 U/L Final   • Alkaline Phosphatase 01/30/2019 110  38 - 126 U/L Final   • Total Bilirubin 01/30/2019 0.7  0.2 - 1.3 mg/dL Final   • eGFR Non African Amer 01/30/2019 105  71 - 165 mL/min/1.73 Final   • Globulin 01/30/2019 2.8  2.3 - 3.5 gm/dL Final   • A/G Ratio 01/30/2019 1.7  1.1 - 1.8 g/dL Final   • BUN/Creatinine Ratio 01/30/2019 13.2  7.0 - 25.0 Final   • Anion Gap 01/30/2019 8.0  5.0 - 15.0 mmol/L Final   • WBC 01/30/2019 6.93  3.20 - 9.80 10*3/mm3 Final   • RBC 01/30/2019 4.29  3.77 - 5.16 10*6/mm3 Final   • Hemoglobin 01/30/2019 14.2  12.0 - 15.5 g/dL Final   • Hematocrit 01/30/2019 39.2  35.0 - 45.0 % Final   • MCV 01/30/2019 91.4  80.0 - 98.0 fL Final   • MCH 01/30/2019 33.1  26.5 - 34.0 pg Final   • MCHC 01/30/2019 36.2* 31.4 - 36.0 g/dL Final   • RDW 01/30/2019 11.9  11.5 - 14.5 % Final   • RDW-SD 01/30/2019 40.0  36.4 - 46.3 fl Final   • MPV 01/30/2019 9.4  8.0 - 12.0 fL Final   • Platelets 01/30/2019 357  150 - 450 10*3/mm3 Final   • Neutrophil % 01/30/2019 47.9  37.0 - 80.0 % Final   • Lymphocyte % 01/30/2019 39.0  10.0 - 50.0 % Final   • Monocyte % 01/30/2019 8.7  0.0 - 12.0 % Final   • Eosinophil % 01/30/2019 3.2  0.0 - 7.0 % Final   • Basophil % 01/30/2019 0.6  0.0 - 2.0 % Final   • Immature Grans % 01/30/2019 0.6* 0.0 - 0.5 % Final   • Neutrophils, Absolute 01/30/2019 3.33  2.00 - 8.60 10*3/mm3 Final   • Lymphocytes, Absolute 01/30/2019 2.70  0.60 - 4.20 10*3/mm3 Final   • Monocytes, Absolute 01/30/2019 0.60  0.00 - 0.90 10*3/mm3 Final   • Eosinophils, Absolute 01/30/2019 0.22  0.00 - 0.70 10*3/mm3 Final   • Basophils, Absolute 01/30/2019 0.04  0.00 - 0.20 10*3/mm3 Final   • Immature Grans, Absolute 01/30/2019 0.04* 0.00 - 0.02 10*3/mm3 Final     Assessment/Plan      1. Diarrhea, unspecified type    2. Chronic gastritis without bleeding, unspecified gastritis type    3. Elevated LFTs    .        Orders placed during this encounter include:  No orders of the defined types were placed in this encounter.      * Surgery not found *    Review and/or summary of lab tests, radiology, procedures, medications. Review and summary of old records and obtaining of history. The risks and benefits of my recommendations, as well as other treatment options were discussed with the patient today. Questions were answered.    No orders of the defined types were placed in this encounter.      Follow-up: Return in about 4 months (around 7/1/2019).          This document has been electronically signed by DEIDRA Castro on March 1, 2019 1:47 PM             Results for orders placed or performed in visit on 01/30/19   CBC Auto Differential   Result Value Ref Range    WBC 6.93 3.20 - 9.80 10*3/mm3    RBC 4.29 3.77 - 5.16 10*6/mm3    Hemoglobin 14.2 12.0 - 15.5 g/dL    Hematocrit 39.2 35.0 - 45.0 %    MCV 91.4 80.0 - 98.0 fL    MCH 33.1 26.5 - 34.0 pg    MCHC 36.2 (H) 31.4 - 36.0 g/dL    RDW 11.9 11.5 - 14.5 %    RDW-SD 40.0 36.4 - 46.3 fl    MPV 9.4 8.0 - 12.0 fL    Platelets 357 150 - 450 10*3/mm3    Neutrophil % 47.9 37.0 - 80.0 %    Lymphocyte % 39.0 10.0 - 50.0 %    Monocyte % 8.7 0.0 - 12.0 %    Eosinophil % 3.2 0.0 - 7.0 %    Basophil % 0.6 0.0 - 2.0 %    Immature Grans % 0.6 (H) 0.0 - 0.5 %    Neutrophils, Absolute 3.33 2.00 - 8.60 10*3/mm3    Lymphocytes, Absolute 2.70 0.60 - 4.20 10*3/mm3    Monocytes, Absolute 0.60 0.00 - 0.90 10*3/mm3    Eosinophils, Absolute 0.22 0.00 - 0.70 10*3/mm3    Basophils, Absolute 0.04 0.00 - 0.20 10*3/mm3    Immature Grans, Absolute 0.04 (H) 0.00 - 0.02 10*3/mm3   Comprehensive metabolic panel   Result Value Ref Range    Glucose 93 60 - 100 mg/dL    BUN 9 7 - 21 mg/dL    Creatinine 0.68 0.50 - 1.00 mg/dL    Sodium 135 (L) 137 - 145 mmol/L    Potassium 3.9 3.5 - 5.1 mmol/L    Chloride 99 95 - 110 mmol/L    CO2 28.0 22.0 - 31.0 mmol/L    Calcium 9.6 8.4 - 10.2 mg/dL    Total Protein 7.5  6.3 - 8.6 g/dL    Albumin 4.70 3.40 - 4.80 g/dL    ALT (SGPT) 87 (H) 9 - 52 U/L    AST (SGOT) 77 (H) 14 - 36 U/L    Alkaline Phosphatase 110 38 - 126 U/L    Total Bilirubin 0.7 0.2 - 1.3 mg/dL    eGFR Non  Amer 105 71 - 165 mL/min/1.73    Globulin 2.8 2.3 - 3.5 gm/dL    A/G Ratio 1.7 1.1 - 1.8 g/dL    BUN/Creatinine Ratio 13.2 7.0 - 25.0    Anion Gap 8.0 5.0 - 15.0 mmol/L   Results for orders placed or performed in visit on 11/27/18   Hepatic Function Panel   Result Value Ref Range    Total Protein 8.0 6.3 - 8.6 g/dL    Albumin 4.90 (H) 3.40 - 4.80 g/dL    ALT (SGPT) 47 9 - 52 U/L    AST (SGOT) 46 (H) 14 - 36 U/L    Alkaline Phosphatase 92 38 - 126 U/L    Total Bilirubin 0.9 0.2 - 1.3 mg/dL    Bilirubin, Direct 0.0 0.0 - 0.3 mg/dL    Bilirubin, Indirect 0.6 0.0 - 1.1 mg/dL   Results for orders placed or performed during the hospital encounter of 11/15/18   Tissue Pathology Exam   Result Value Ref Range    Case Report       Surgical Pathology Report                         Case: [id number]                                  Authorizing Provider:  Jaiyeola, Aderemi, MD      Collected:           11/15/2018 03:21 PM          Ordering Location:     [facility]             Received:            11/16/2018 10:12 AM                                 [facility]                                                     Pathologist:           Kalen Dallas MD                                                         Specimens:   1) - Stomach, gastric bx's                                                                          2) - Small Intestine, Duodenum                                                             Final Diagnosis       1.  MUCOSA, STOMACH:   CHRONIC GASTRITIS.   NEGATIVE FOR HELICOBACTER PYLORI (HP IMMUNOSTSAIN).     2.  MUCOSA, DUODENUM:   NO SIGNIFICANT HISTOLOGIC ABNORMALITY.       Comment       Helicobacter pylori (HP) immunostain is performed (block 1) because an appropriate inflammatory  "milieu is present and organisms are not seen on H & E stained slides.     HP immunostain was developed and [facility]  It has not been cleared or approved by the U.S.Food and Drug Administration.  The FDA has determined that such clearance of approval is not necessary.  This test is used for clinical purposes.  It should not be regarded as investigational or for research.  This laboratory is certified under the Clinical Laboratory Amendments of 1988 (CLIA-88) as qualified to perform high complexity clinical laboratory testing.         Gross Description       1.  The first container is labeled \"stomach\" and has nodular bits of white soft tissue measuring 0.7 x 0.2 x 0.2 cm and 0.3 x 0.2 x 0.2 cm.  The entire specimen is embedded as 1A.     2.  The second container is labeled \"duodenum\" and has nodular bits of white soft tissue measuring 0.4 cm in greatest dimension.  The entire specimen is embedded as 2A.      Pregnancy, Urine - Urine, Clean Catch   Result Value Ref Range    HCG, Urine QL Negative Negative   KOH Prep - Brushing, Esophagus   Result Value Ref Range    KOH Prep Yeast seen (A) No yeast or hyphal elements seen   Results for orders placed or performed in visit on 11/02/18   Glia(IgA / G) & TTG(IgA / G)   Result Value Ref Range    Gliadin Deamidated Peptide Ab, IgA 33 (H) 0 - 19 units    Deaminated Gliadin Ab IgG 10 0 - 19 units    Tissue Transglutaminase IgA 5 (H) 0 - 3 U/mL    Tissue Transglutaminase IgG 5 0 - 5 U/mL   Allergens (12) Foods   Result Value Ref Range    Class Description Comment     Egg White 0.90 (A) Class II kU/L    Milk, Cow's 0.27 (A) Class 0/I kU/L    CodFish <0.10 Class 0 kU/L    Sesame Seed 0.69 (A) Class II kU/L    Peanut 0.97 (A) Class II kU/L    Soybean 0.27 (A) Class 0/I kU/L    Hazelnut 0.42 (A) Class I kU/L    Shrimp <0.10 Class 0 kU/L    Scallop <0.10 Class 0 kU/L    Gluten 0.25 (A) Class 0/I " kU/L    [place] 0.67 (A) Class II kU/L    Wheat 0.73 (A) Class II kU/L   Results for orders placed or performed in visit on 11/02/18   CBC Auto Differential   Result Value Ref Range    WBC 7.40 3.20 - 9.80 10*3/mm3    RBC 4.35 3.77 - 5.16 10*6/mm3    Hemoglobin 14.4 12.0 - 15.5 g/dL    Hematocrit 39.3 35.0 - 45.0 %    MCV 90.3 80.0 - 98.0 fL    MCH 33.1 26.5 - 34.0 pg    MCHC 36.6 (H) 31.4 - 36.0 g/dL    RDW 12.4 11.5 - 14.5 %    RDW-SD 41.4 36.4 - 46.3 fl    MPV 9.1 8.0 - 12.0 fL    Platelets 383 150 - 450 10*3/mm3    Neutrophil % 66.1 37.0 - 80.0 %    Lymphocyte % 23.4 10.0 - 50.0 %    Monocyte % 7.8 0.0 - 12.0 %    Eosinophil % 1.8 0.0 - 7.0 %    Basophil % 0.5 0.0 - 2.0 %    Immature Grans % 0.4 0.0 - 0.5 %    Neutrophils, Absolute 4.89 2.00 - 8.60 10*3/mm3    Lymphocytes, Absolute 1.73 0.60 - 4.20 10*3/mm3    Monocytes, Absolute 0.58 0.00 - 0.90 10*3/mm3    Eosinophils, Absolute 0.13 0.00 - 0.70 10*3/mm3    Basophils, Absolute 0.04 0.00 - 0.20 10*3/mm3    Immature Grans, Absolute 0.03 (H) 0.00 - 0.02 10*3/mm3    nRBC 0.0 0.0 - 0.0 /100 WBC   Hepatic Function Panel   Result Value Ref Range    Total Protein 8.1 6.3 - 8.6 g/dL    Albumin 4.60 3.40 - 4.80 g/dL    ALT (SGPT) 40 9 - 52 U/L    AST (SGOT) 43 (H) 14 - 36 U/L    Alkaline Phosphatase 104 38 - 126 U/L    Total Bilirubin 1.7 (H) 0.2 - 1.3 mg/dL    Bilirubin, Direct 0.0 0.0 - 0.3 mg/dL    Bilirubin, Indirect 1.4 (H) 0.0 - 1.1 mg/dL     *Note: Due to a large number of results and/or encounters for the requested time period, some results have not been displayed. A complete set of results can be found in Results Review.

## 2019-03-06 ENCOUNTER — TELEPHONE (OUTPATIENT)
Dept: FAMILY MEDICINE CLINIC | Facility: CLINIC | Age: 24
End: 2019-03-06

## 2019-03-07 ENCOUNTER — TELEPHONE (OUTPATIENT)
Dept: FAMILY MEDICINE CLINIC | Facility: CLINIC | Age: 24
End: 2019-03-07

## 2019-03-27 RX ORDER — PANTOPRAZOLE SODIUM 40 MG/1
40 TABLET, DELAYED RELEASE ORAL DAILY
Qty: 30 TABLET | Refills: 11 | Status: SHIPPED | OUTPATIENT
Start: 2019-03-27 | End: 2020-04-01 | Stop reason: SDUPTHER

## 2019-03-27 RX ORDER — PANTOPRAZOLE SODIUM 40 MG/1
40 TABLET, DELAYED RELEASE ORAL DAILY
Qty: 30 TABLET | Refills: 11 | Status: SHIPPED | OUTPATIENT
Start: 2019-03-27 | End: 2019-03-27 | Stop reason: SDUPTHER

## 2019-06-28 ENCOUNTER — OFFICE VISIT (OUTPATIENT)
Dept: GASTROENTEROLOGY | Facility: CLINIC | Age: 24
End: 2019-06-28

## 2019-06-28 VITALS
OXYGEN SATURATION: 100 % | HEIGHT: 60 IN | BODY MASS INDEX: 18.22 KG/M2 | HEART RATE: 90 BPM | DIASTOLIC BLOOD PRESSURE: 58 MMHG | WEIGHT: 92.8 LBS | SYSTOLIC BLOOD PRESSURE: 104 MMHG

## 2019-06-28 DIAGNOSIS — K58.0 IRRITABLE BOWEL SYNDROME WITH DIARRHEA: Primary | ICD-10-CM

## 2019-06-28 DIAGNOSIS — K29.50 CHRONIC GASTRITIS WITHOUT BLEEDING, UNSPECIFIED GASTRITIS TYPE: ICD-10-CM

## 2019-06-28 PROCEDURE — 99213 OFFICE O/P EST LOW 20 MIN: CPT | Performed by: NURSE PRACTITIONER

## 2019-06-28 NOTE — PROGRESS NOTES
Chief Complaint   Patient presents with   • Abdominal Pain   • Diarrhea       Subjective    Arlette Zaidi is a 24 y.o. female. she is here today for follow-up.    History of Present Illness  24-year-old female presents for follow-up regarding her bowel syndrome and reflux.  States she has done very well in the last few months.  She had mono in April but has recovered well from that.  Denies any current abdominal pain, nausea, vomiting or change within her bowel habits.  She is followed by Dr. Johnson for multiple food allergies at last check her liver enzymes had improved.  Of note they were elevated during her episode of mono.  Reports reflux is well controlled with Protonix daily.  EGD was completed 11/15/2018 and noted esophageal candidiasis and gastritis.  States she has not had Carafate in some time.  And her weight is up 3 pounds from last office visit.  She got engaged last Wednesday  Plan; follow-up in 6 months we will recheck hepatic enzymes at that time.  Continue follow with allergist.  Discussed decreasing Protonix to every other day and discontinue if able to tolerate.       The following portions of the patient's history were reviewed and updated as appropriate:   Past Medical History:   Diagnosis Date   • Acute laryngitis    • Allergic rhinitis due to pollen    • Asthma     Asthmatic tendency      • Cough    • Cyst of ovary    • Encounter for initial prescription of contraceptive pills    • Encounter for surveillance of contraceptive pills    • Health maintenance examination    • Nonspecific reaction to tuberculin skin test without active tuberculosis     Negatiave   • Underimmunization status    • Upper respiratory infection    • Vaccination required    • Visit for gynecologic examination    • Well child visit      Past Surgical History:   Procedure Laterality Date   • CHOLECYSTECTOMY     • COLONOSCOPY N/A 11/15/2018    Procedure: COLONOSCOPY;  Surgeon: Jaiyeola, Aderemi, MD;  Location:   [facility] ENDOSCOPY;  Service: Gastroenterology   • ENDOSCOPY N/A 11/15/2018    Procedure: ESOPHAGOGASTRODUODENOSCOPY;  Surgeon: Jaiyeola, Aderemi, MD;  Location: [facility] ENDOSCOPY;  Service: Gastroenterology   • INJECTION OF MEDICATION  04/17/2015    Celestone (betamethasone) (3)    • INJECTION OF MEDICATION  01/18/2016    Depo Medrol (Methylprednisone) (9)      Family History   Problem Relation Age of Onset   • Ulcerative colitis Maternal Uncle    • Ulcerative colitis Maternal Grandmother      OB History     No data available        Prior to Admission medications    Medication Sig Start Date End Date Taking? Authorizing Provider   fexofenadine (ALLEGRA) 180 MG tablet Take 180 mg by mouth Daily.   Yes ProviderMagali MD   fluticasone (FLONASE) 50 MCG/ACT nasal spray 1 spray into each nostril.   Yes ProviderMagali MD   montelukast (SINGULAIR) 10 MG tablet TAKE 1 TABLET BY MOUTH EVERY NIGHT AT BEDTIME 12/4/17  Yes Ronnie Warren MD   pantoprazole (PROTONIX) 40 MG EC tablet Take 1 tablet by mouth Daily. 3/27/19  Yes Fatou Lin APRN   traZODone (DESYREL) 50 MG tablet Take 1 tablet by mouth Every Night. 1/30/19  Yes Michael Nassar MD   sucralfate (CARAFATE) 1 g tablet Take 1 tablet by mouth 4 (Four) Times a Day. 11/2/18   Fatou Lin APRN     Allergies   Allergen Reactions   • Azithromycin Nausea And Vomiting   • Penicillins GI Intolerance and Nausea Only     Social History     Socioeconomic History   • Marital status: Single     Spouse name: Not on file   • Number of children: Not on file   • Years of education: Not on file   • Highest education level: Not on file   Tobacco Use   • Smoking status: Never Smoker   • Smokeless tobacco: Never Used   Substance and Sexual Activity   • Alcohol use: No   • Drug use: Defer   • Sexual activity: Defer       Review of Systems  Review of Systems   Constitutional: Negative for activity change, appetite change, chills, diaphoresis, fatigue, fever and unexpected  "weight change.   HENT: Negative for sore throat and trouble swallowing.    Respiratory: Negative for shortness of breath.    Gastrointestinal: Negative for abdominal distention, abdominal pain, anal bleeding, blood in stool, constipation, diarrhea, nausea, rectal pain and vomiting.   Musculoskeletal: Negative for arthralgias.   Skin: Negative for pallor.   Neurological: Negative for light-headedness.        /58 (BP Location: Right arm, Patient Position: Sitting)   Pulse 90   Ht 152.4 cm (60\")   Wt 42.1 kg (92 lb 12.8 oz)   LMP 06/23/2019   SpO2 100%   BMI 18.12 kg/m²     Objective    Physical Exam   Constitutional: She is oriented to person, place, and time. She appears well-developed and well-nourished. She is cooperative. No distress.   HENT:   Head: Normocephalic and atraumatic.   Neck: Normal range of motion. Neck supple. No thyromegaly present.   Cardiovascular: Normal rate, regular rhythm and normal heart sounds.   Pulmonary/Chest: Effort normal and breath sounds normal. She has no wheezes. She has no rhonchi. She has no rales.   Abdominal: Soft. Normal appearance and bowel sounds are normal. She exhibits no distension. There is no hepatosplenomegaly. There is no tenderness. There is no rigidity and no guarding. No hernia.   Lymphadenopathy:     She has no cervical adenopathy.   Neurological: She is alert and oriented to person, place, and time.   Skin: Skin is warm, dry and intact. No rash noted. No pallor.   Psychiatric: She has a normal mood and affect. Her speech is normal.     Lab on 03/01/2019   Component Date Value Ref Range Status   • Glucose 03/01/2019 81  60 - 100 mg/dL Final   • BUN 03/01/2019 8  7 - 21 mg/dL Final   • Creatinine 03/01/2019 0.57  0.50 - 1.00 mg/dL Final   • Sodium 03/01/2019 138  137 - 145 mmol/L Final   • Potassium 03/01/2019 4.1  3.5 - 5.1 mmol/L Final   • Chloride 03/01/2019 100  95 - 110 mmol/L Final   • CO2 03/01/2019 28.0  22.0 - 31.0 mmol/L Final   • Calcium " 03/01/2019 9.3  8.4 - 10.2 mg/dL Final   • Total Protein 03/01/2019 7.6  6.3 - 8.6 g/dL Final   • Albumin 03/01/2019 4.30  3.40 - 4.80 g/dL Final   • ALT (SGPT) 03/01/2019 29  9 - 52 U/L Final   • AST (SGOT) 03/01/2019 55* 14 - 36 U/L Final   • Alkaline Phosphatase 03/01/2019 102  38 - 126 U/L Final   • Total Bilirubin 03/01/2019 0.9  0.2 - 1.3 mg/dL Final   • eGFR Non African Amer 03/01/2019 129  71 - 165 mL/min/1.73 Final   • Globulin 03/01/2019 3.3  2.3 - 3.5 gm/dL Final   • A/G Ratio 03/01/2019 1.3  1.1 - 1.8 g/dL Final   • BUN/Creatinine Ratio 03/01/2019 14.0  7.0 - 25.0 Final   • Anion Gap 03/01/2019 10.0  5.0 - 15.0 mmol/L Final   • WBC 03/01/2019 5.72  3.40 - 10.80 10*3/mm3 Final   • RBC 03/01/2019 4.07  3.77 - 5.28 10*6/mm3 Final   • Hemoglobin 03/01/2019 13.5  12.0 - 15.9 g/dL Final   • Hematocrit 03/01/2019 37.5  34.0 - 46.6 % Final   • MCV 03/01/2019 92.1  79.0 - 97.0 fL Final   • MCH 03/01/2019 33.2* 26.6 - 33.0 pg Final   • MCHC 03/01/2019 36.0* 31.5 - 35.7 g/dL Final   • RDW 03/01/2019 11.8* 12.3 - 15.4 % Final   • RDW-SD 03/01/2019 39.7  37.0 - 54.0 fl Final   • MPV 03/01/2019 9.7  6.0 - 12.0 fL Final   • Platelets 03/01/2019 385  140 - 450 10*3/mm3 Final   • Neutrophil % 03/01/2019 53.7  42.7 - 76.0 % Final   • Lymphocyte % 03/01/2019 35.5  19.6 - 45.3 % Final   • Monocyte % 03/01/2019 7.0  5.0 - 12.0 % Final   • Eosinophil % 03/01/2019 2.4  0.3 - 6.2 % Final   • Basophil % 03/01/2019 0.9  0.0 - 1.5 % Final   • Immature Grans % 03/01/2019 0.5  0.0 - 0.5 % Final   • Neutrophils, Absolute 03/01/2019 3.07  1.40 - 7.00 10*3/mm3 Final   • Lymphocytes, Absolute 03/01/2019 2.03  0.70 - 3.10 10*3/mm3 Final   • Monocytes, Absolute 03/01/2019 0.40  0.10 - 0.90 10*3/mm3 Final   • Eosinophils, Absolute 03/01/2019 0.14  0.00 - 0.40 10*3/mm3 Final   • Basophils, Absolute 03/01/2019 0.05  0.00 - 0.20 10*3/mm3 Final   • Immature Grans, Absolute 03/01/2019 0.03  0.00 - 0.05 10*3/mm3 Final   • nRBC 03/01/2019 0.0   0.0 - 0.0 /100 WBC Final     Assessment/Plan      1. Irritable bowel syndrome with diarrhea    2. Chronic gastritis without bleeding, unspecified gastritis type    .       Orders placed during this encounter include:  No orders of the defined types were placed in this encounter.      * Surgery not found *    Review and/or summary of lab tests, radiology, procedures, medications. Review and summary of old records and obtaining of history. The risks and benefits of my recommendations, as well as other treatment options were discussed with the patient today. Questions were answered.    No orders of the defined types were placed in this encounter.      Follow-up: Return in about 6 months (around 12/28/2019).          This document has been electronically signed by DEIDRA Castro on June 28, 2019 11:38 AM             Results for orders placed or performed in visit on 03/01/19   CBC Auto Differential   Result Value Ref Range    WBC 5.72 3.40 - 10.80 10*3/mm3    RBC 4.07 3.77 - 5.28 10*6/mm3    Hemoglobin 13.5 12.0 - 15.9 g/dL    Hematocrit 37.5 34.0 - 46.6 %    MCV 92.1 79.0 - 97.0 fL    MCH 33.2 (H) 26.6 - 33.0 pg    MCHC 36.0 (H) 31.5 - 35.7 g/dL    RDW 11.8 (L) 12.3 - 15.4 %    RDW-SD 39.7 37.0 - 54.0 fl    MPV 9.7 6.0 - 12.0 fL    Platelets 385 140 - 450 10*3/mm3    Neutrophil % 53.7 42.7 - 76.0 %    Lymphocyte % 35.5 19.6 - 45.3 %    Monocyte % 7.0 5.0 - 12.0 %    Eosinophil % 2.4 0.3 - 6.2 %    Basophil % 0.9 0.0 - 1.5 %    Immature Grans % 0.5 0.0 - 0.5 %    Neutrophils, Absolute 3.07 1.40 - 7.00 10*3/mm3    Lymphocytes, Absolute 2.03 0.70 - 3.10 10*3/mm3    Monocytes, Absolute 0.40 0.10 - 0.90 10*3/mm3    Eosinophils, Absolute 0.14 0.00 - 0.40 10*3/mm3    Basophils, Absolute 0.05 0.00 - 0.20 10*3/mm3    Immature Grans, Absolute 0.03 0.00 - 0.05 10*3/mm3    nRBC 0.0 0.0 - 0.0 /100 WBC   Comprehensive metabolic panel   Result Value Ref Range    Glucose 81 60 - 100 mg/dL    BUN 8 7 - 21 mg/dL    Creatinine 0.57  0.50 - 1.00 mg/dL    Sodium 138 137 - 145 mmol/L    Potassium 4.1 3.5 - 5.1 mmol/L    Chloride 100 95 - 110 mmol/L    CO2 28.0 22.0 - 31.0 mmol/L    Calcium 9.3 8.4 - 10.2 mg/dL    Total Protein 7.6 6.3 - 8.6 g/dL    Albumin 4.30 3.40 - 4.80 g/dL    ALT (SGPT) 29 9 - 52 U/L    AST (SGOT) 55 (H) 14 - 36 U/L    Alkaline Phosphatase 102 38 - 126 U/L    Total Bilirubin 0.9 0.2 - 1.3 mg/dL    eGFR Non  Amer 129 71 - 165 mL/min/1.73    Globulin 3.3 2.3 - 3.5 gm/dL    A/G Ratio 1.3 1.1 - 1.8 g/dL    BUN/Creatinine Ratio 14.0 7.0 - 25.0    Anion Gap 10.0 5.0 - 15.0 mmol/L   Results for orders placed or performed in visit on 01/30/19   CBC Auto Differential   Result Value Ref Range    WBC 6.93 3.20 - 9.80 10*3/mm3    RBC 4.29 3.77 - 5.16 10*6/mm3    Hemoglobin 14.2 12.0 - 15.5 g/dL    Hematocrit 39.2 35.0 - 45.0 %    MCV 91.4 80.0 - 98.0 fL    MCH 33.1 26.5 - 34.0 pg    MCHC 36.2 (H) 31.4 - 36.0 g/dL    RDW 11.9 11.5 - 14.5 %    RDW-SD 40.0 36.4 - 46.3 fl    MPV 9.4 8.0 - 12.0 fL    Platelets 357 150 - 450 10*3/mm3    Neutrophil % 47.9 37.0 - 80.0 %    Lymphocyte % 39.0 10.0 - 50.0 %    Monocyte % 8.7 0.0 - 12.0 %    Eosinophil % 3.2 0.0 - 7.0 %    Basophil % 0.6 0.0 - 2.0 %    Immature Grans % 0.6 (H) 0.0 - 0.5 %    Neutrophils, Absolute 3.33 2.00 - 8.60 10*3/mm3    Lymphocytes, Absolute 2.70 0.60 - 4.20 10*3/mm3    Monocytes, Absolute 0.60 0.00 - 0.90 10*3/mm3    Eosinophils, Absolute 0.22 0.00 - 0.70 10*3/mm3    Basophils, Absolute 0.04 0.00 - 0.20 10*3/mm3    Immature Grans, Absolute 0.04 (H) 0.00 - 0.02 10*3/mm3   Comprehensive metabolic panel   Result Value Ref Range    Glucose 93 60 - 100 mg/dL    BUN 9 7 - 21 mg/dL    Creatinine 0.68 0.50 - 1.00 mg/dL    Sodium 135 (L) 137 - 145 mmol/L    Potassium 3.9 3.5 - 5.1 mmol/L    Chloride 99 95 - 110 mmol/L    CO2 28.0 22.0 - 31.0 mmol/L    Calcium 9.6 8.4 - 10.2 mg/dL    Total Protein 7.5 6.3 - 8.6 g/dL    Albumin 4.70 3.40 - 4.80 g/dL    ALT (SGPT) 87 (H) 9 - 52 U/L     AST (SGOT) 77 (H) 14 - 36 U/L    Alkaline Phosphatase 110 38 - 126 U/L    Total Bilirubin 0.7 0.2 - 1.3 mg/dL    eGFR Non  Amer 105 71 - 165 mL/min/1.73    Globulin 2.8 2.3 - 3.5 gm/dL    A/G Ratio 1.7 1.1 - 1.8 g/dL    BUN/Creatinine Ratio 13.2 7.0 - 25.0    Anion Gap 8.0 5.0 - 15.0 mmol/L   Results for orders placed or performed in visit on 11/27/18   Hepatic Function Panel   Result Value Ref Range    Total Protein 8.0 6.3 - 8.6 g/dL    Albumin 4.90 (H) 3.40 - 4.80 g/dL    ALT (SGPT) 47 9 - 52 U/L    AST (SGOT) 46 (H) 14 - 36 U/L    Alkaline Phosphatase 92 38 - 126 U/L    Total Bilirubin 0.9 0.2 - 1.3 mg/dL    Bilirubin, Direct 0.0 0.0 - 0.3 mg/dL    Bilirubin, Indirect 0.6 0.0 - 1.1 mg/dL   Results for orders placed or performed during the hospital encounter of 11/15/18   Tissue Pathology Exam   Result Value Ref Range    Case Report       Surgical Pathology Report                         Case: [id number]                                  Authorizing Provider:  Jaiyeola, Aderemi, MD      Collected:           11/15/2018 03:21 PM          Ordering Location:     [facility]             Received:            11/16/2018 10:12 AM                                 [facility]                                                     Pathologist:           Kalen Dallas MD                                                         Specimens:   1) - Stomach, gastric bx's                                                                          2) - Small Intestine, Duodenum                                                             Final Diagnosis       1.  MUCOSA, STOMACH:   CHRONIC GASTRITIS.   NEGATIVE FOR HELICOBACTER PYLORI (HP IMMUNOSTSAIN).     2.  MUCOSA, DUODENUM:   NO SIGNIFICANT HISTOLOGIC ABNORMALITY.       Comment       Helicobacter pylori (HP) immunostain is performed (block 1) because an appropriate inflammatory milieu is present and organisms are not seen on H & E stained slides.     HP  "immunostain was developed and [facility] performance characteristics [facility].  It has not been cleared or approved by the U.S.Food and Drug Administration.  The FDA has determined that such clearance of approval is not necessary.  This test is used for clinical purposes.  It should not be regarded as investigational or for research.  This laboratory is certified under the Clinical Laboratory Amendments of 1988 (CLIA-88) as qualified to perform high complexity clinical laboratory testing.         Gross Description       1.  The first container is labeled \"stomach\" and has nodular bits of white soft tissue measuring 0.7 x 0.2 x 0.2 cm and 0.3 x 0.2 x 0.2 cm.  The entire specimen is embedded as 1A.     2.  The second container is labeled \"duodenum\" and has nodular bits of white soft tissue measuring 0.4 cm in greatest dimension.  The entire specimen is embedded as 2A.      Pregnancy, Urine - Urine, Clean Catch   Result Value Ref Range    HCG, Urine QL Negative Negative   KOH Prep - Brushing, Esophagus   Result Value Ref Range    KOH Prep Yeast seen (A) No yeast or hyphal elements seen   Results for orders placed or performed in visit on 11/02/18   Glia(IgA / G) & TTG(IgA / G)   Result Value Ref Range    Gliadin Deamidated Peptide Ab, IgA 33 (H) 0 - 19 units    Deaminated Gliadin Ab IgG 10 0 - 19 units    Tissue Transglutaminase IgA 5 (H) 0 - 3 U/mL    Tissue Transglutaminase IgG 5 0 - 5 U/mL     *Note: Due to a large number of results and/or encounters for the requested time period, some results have not been displayed. A complete set of results can be found in Results Review.     "

## 2019-06-28 NOTE — PATIENT INSTRUCTIONS

## 2019-09-09 RX ORDER — TRAZODONE HYDROCHLORIDE 50 MG/1
50 TABLET ORAL NIGHTLY
Qty: 90 TABLET | Refills: 0 | Status: SHIPPED | OUTPATIENT
Start: 2019-09-09 | End: 2019-11-01 | Stop reason: SDUPTHER

## 2019-11-01 ENCOUNTER — OFFICE VISIT (OUTPATIENT)
Dept: FAMILY MEDICINE CLINIC | Facility: CLINIC | Age: 24
End: 2019-11-01

## 2019-11-01 VITALS
SYSTOLIC BLOOD PRESSURE: 118 MMHG | BODY MASS INDEX: 18.55 KG/M2 | WEIGHT: 94.5 LBS | OXYGEN SATURATION: 100 % | DIASTOLIC BLOOD PRESSURE: 76 MMHG | HEART RATE: 86 BPM | HEIGHT: 60 IN

## 2019-11-01 DIAGNOSIS — G47.00 INSOMNIA, UNSPECIFIED TYPE: Primary | ICD-10-CM

## 2019-11-01 DIAGNOSIS — R21 RASH: ICD-10-CM

## 2019-11-01 DIAGNOSIS — K21.9 GASTROESOPHAGEAL REFLUX DISEASE, ESOPHAGITIS PRESENCE NOT SPECIFIED: ICD-10-CM

## 2019-11-01 PROCEDURE — 99214 OFFICE O/P EST MOD 30 MIN: CPT | Performed by: FAMILY MEDICINE

## 2019-11-01 RX ORDER — PREDNISONE 10 MG/1
TABLET ORAL
Qty: 30 TABLET | Refills: 0 | Status: SHIPPED | OUTPATIENT
Start: 2019-11-01 | End: 2019-12-31

## 2019-11-01 RX ORDER — TRAZODONE HYDROCHLORIDE 50 MG/1
50 TABLET ORAL NIGHTLY
Qty: 90 TABLET | Refills: 1 | Status: SHIPPED | OUTPATIENT
Start: 2019-11-01 | End: 2020-07-20

## 2019-11-01 NOTE — PROGRESS NOTES
Subjective   Arlette Zaidi is a 24 y.o. female.   Cc: follow up of chronic medical issues  History of Present Illness The patient comes in for check of her Insomnia and GERD. She is at her base line.. She has a lesion on her neck that has been present for three months.She has tried over the counter antifungals with no help.She denies new detergents,sopes or shampoo.  The following portions of the patient's history were reviewed and updated as appropriate: allergies, current medications, past family history, past medical history, past social history, past surgical history and problem list.    Review of Systems   Constitutional: Negative for fatigue and fever.   Respiratory: Negative for cough, chest tightness and stridor.    Cardiovascular: Negative for chest pain, palpitations and leg swelling.       Objective   Physical Exam   Constitutional: She is oriented to person, place, and time. She appears well-developed and well-nourished.   HENT:   Head: Normocephalic and atraumatic.   Right Ear: External ear normal.   Left Ear: External ear normal.   Nose: Nose normal.   Mouth/Throat: Oropharynx is clear and moist.   Eyes: Conjunctivae are normal.   Neck: Normal range of motion.   Cardiovascular: Normal rate, regular rhythm and normal heart sounds. Exam reveals no gallop and no friction rub.   No murmur heard.  Pulmonary/Chest: Effort normal and breath sounds normal. No stridor. No respiratory distress. She has no wheezes.   Abdominal: Soft. Bowel sounds are normal. She exhibits no distension. There is no tenderness. There is no guarding.   Neurological: She is alert and oriented to person, place, and time.   Skin: Skin is warm and dry.   There is an erythematous area on the back of her neck with evidence of abrasion. It is about one by one and a half inches   Vitals reviewed.        Assessment/Plan   Arlette was seen today for skin lesion.    Diagnoses and all orders for this visit:    Insomnia, unspecified  type    Gastroesophageal reflux disease, esophagitis presence not specified  -     Comprehensive metabolic panel; Future  -     CBC w AUTO Differential; Future    Rash    Other orders  -     traZODone (DESYREL) 50 MG tablet; Take 1 tablet by mouth Every Night.  -     predniSONE (DELTASONE) 10 MG tablet; .4 daily times three days, 3 daily times three days, 2 daily times three days,1 daily times three days      Return to the clinic in 4 month/s.  Will contact with results as needed.

## 2019-11-05 ENCOUNTER — LAB (OUTPATIENT)
Dept: LAB | Facility: HOSPITAL | Age: 24
End: 2019-11-05

## 2019-11-05 DIAGNOSIS — K21.9 GASTROESOPHAGEAL REFLUX DISEASE, ESOPHAGITIS PRESENCE NOT SPECIFIED: ICD-10-CM

## 2019-11-05 LAB
ALBUMIN SERPL-MCNC: 4.2 G/DL (ref 3.5–5.2)
ALBUMIN/GLOB SERPL: 1.2 G/DL
ALP SERPL-CCNC: 73 U/L (ref 39–117)
ALT SERPL W P-5'-P-CCNC: 10 U/L (ref 1–33)
ANION GAP SERPL CALCULATED.3IONS-SCNC: 9.5 MMOL/L (ref 5–15)
AST SERPL-CCNC: 15 U/L (ref 1–32)
BASOPHILS # BLD AUTO: 0.04 10*3/MM3 (ref 0–0.2)
BASOPHILS NFR BLD AUTO: 0.4 % (ref 0–1.5)
BILIRUB SERPL-MCNC: 0.4 MG/DL (ref 0.2–1.2)
BUN BLD-MCNC: 7 MG/DL (ref 6–20)
BUN/CREAT SERPL: 11.1 (ref 7–25)
CALCIUM SPEC-SCNC: 9 MG/DL (ref 8.6–10.5)
CHLORIDE SERPL-SCNC: 102 MMOL/L (ref 98–107)
CO2 SERPL-SCNC: 29.5 MMOL/L (ref 22–29)
CREAT BLD-MCNC: 0.63 MG/DL (ref 0.57–1)
DEPRECATED RDW RBC AUTO: 40.6 FL (ref 37–54)
EOSINOPHIL # BLD AUTO: 0.01 10*3/MM3 (ref 0–0.4)
EOSINOPHIL NFR BLD AUTO: 0.1 % (ref 0.3–6.2)
ERYTHROCYTE [DISTWIDTH] IN BLOOD BY AUTOMATED COUNT: 11.8 % (ref 12.3–15.4)
GFR SERPL CREATININE-BSD FRML MDRD: 115 ML/MIN/1.73
GLOBULIN UR ELPH-MCNC: 3.4 GM/DL
GLUCOSE BLD-MCNC: 77 MG/DL (ref 65–99)
HCT VFR BLD AUTO: 39.4 % (ref 34–46.6)
HGB BLD-MCNC: 13.6 G/DL (ref 12–15.9)
IMM GRANULOCYTES # BLD AUTO: 0.06 10*3/MM3 (ref 0–0.05)
IMM GRANULOCYTES NFR BLD AUTO: 0.5 % (ref 0–0.5)
LYMPHOCYTES # BLD AUTO: 1.97 10*3/MM3 (ref 0.7–3.1)
LYMPHOCYTES NFR BLD AUTO: 18 % (ref 19.6–45.3)
MCH RBC QN AUTO: 32.2 PG (ref 26.6–33)
MCHC RBC AUTO-ENTMCNC: 34.5 G/DL (ref 31.5–35.7)
MCV RBC AUTO: 93.4 FL (ref 79–97)
MONOCYTES # BLD AUTO: 1.07 10*3/MM3 (ref 0.1–0.9)
MONOCYTES NFR BLD AUTO: 9.8 % (ref 5–12)
NEUTROPHILS # BLD AUTO: 7.79 10*3/MM3 (ref 1.7–7)
NEUTROPHILS NFR BLD AUTO: 71.2 % (ref 42.7–76)
NRBC BLD AUTO-RTO: 0 /100 WBC (ref 0–0.2)
PLATELET # BLD AUTO: 377 10*3/MM3 (ref 140–450)
PMV BLD AUTO: 9.9 FL (ref 6–12)
POTASSIUM BLD-SCNC: 3.7 MMOL/L (ref 3.5–5.2)
PROT SERPL-MCNC: 7.6 G/DL (ref 6–8.5)
RBC # BLD AUTO: 4.22 10*6/MM3 (ref 3.77–5.28)
SODIUM BLD-SCNC: 141 MMOL/L (ref 136–145)
WBC NRBC COR # BLD: 10.94 10*3/MM3 (ref 3.4–10.8)

## 2019-11-05 PROCEDURE — 36415 COLL VENOUS BLD VENIPUNCTURE: CPT

## 2019-11-05 PROCEDURE — 85025 COMPLETE CBC W/AUTO DIFF WBC: CPT

## 2019-11-05 PROCEDURE — 80053 COMPREHEN METABOLIC PANEL: CPT

## 2019-12-31 ENCOUNTER — OFFICE VISIT (OUTPATIENT)
Dept: GASTROENTEROLOGY | Facility: CLINIC | Age: 24
End: 2019-12-31

## 2019-12-31 VITALS
SYSTOLIC BLOOD PRESSURE: 108 MMHG | HEIGHT: 60 IN | WEIGHT: 98.2 LBS | HEART RATE: 84 BPM | DIASTOLIC BLOOD PRESSURE: 64 MMHG | BODY MASS INDEX: 19.28 KG/M2

## 2019-12-31 DIAGNOSIS — K58.0 IRRITABLE BOWEL SYNDROME WITH DIARRHEA: ICD-10-CM

## 2019-12-31 DIAGNOSIS — K21.00 GASTROESOPHAGEAL REFLUX DISEASE WITH ESOPHAGITIS: Primary | ICD-10-CM

## 2019-12-31 PROCEDURE — 99213 OFFICE O/P EST LOW 20 MIN: CPT | Performed by: NURSE PRACTITIONER

## 2019-12-31 RX ORDER — NORETHINDRONE ACETATE AND ETHINYL ESTRADIOL 1; .02 MG/1; MG/1
1 TABLET ORAL DAILY
COMMUNITY
End: 2021-12-20

## 2019-12-31 NOTE — PROGRESS NOTES
Chief Complaint   Patient presents with   • Abdominal Pain   • Diarrhea       Subjective    Arlette Zaidi is a 24 y.o. female. she is here today for follow-up.    History of Present Illness  24-year-old female presents for follow-up regarding irritable bowel syndrome -diarrhea predominant and reflux.  Has been following with Dr. Johnson and done very well modifying diet.  Liver enzymes have been rechecked and were within normal limits.  Had been elevated minimally while she had mononucleosis.  Reflux is well controlled Protonix and dietary modifications she is up 6 pounds from last office visit.  Discontinue Carafate.       The following portions of the patient's history were reviewed and updated as appropriate:   Past Medical History:   Diagnosis Date   • Acute laryngitis    • Allergic rhinitis due to pollen    • Asthma     Asthmatic tendency      • Cough    • Cyst of ovary    • Encounter for initial prescription of contraceptive pills    • Encounter for surveillance of contraceptive pills    • Health maintenance examination    • Nonspecific reaction to tuberculin skin test without active tuberculosis     Negatiave   • Underimmunization status    • Upper respiratory infection    • Vaccination required    • Visit for gynecologic examination    • Well child visit      Past Surgical History:   Procedure Laterality Date   • CHOLECYSTECTOMY     • COLONOSCOPY N/A 11/15/2018    Procedure: COLONOSCOPY;  Surgeon: Jaiyeola, Aderemi, MD;  Location: [facility] ENDOSCOPY;  Service: Gastroenterology   • ENDOSCOPY N/A 11/15/2018    Procedure: ESOPHAGOGASTRODUODENOSCOPY;  Surgeon: Jaiyeola, Aderemi, MD;  Location: [facility] ENDOSCOPY;  Service: Gastroenterology   • INJECTION OF MEDICATION  04/17/2015    Celestone (betamethasone) (3)    • INJECTION OF MEDICATION  01/18/2016    Depo Medrol (Methylprednisone) (9)      Family History   Problem Relation Age of Onset   • Ulcerative colitis Maternal Uncle    • Ulcerative colitis  "Maternal Grandmother      OB History    None       Prior to Admission medications    Medication Sig Start Date End Date Taking? Authorizing Provider   norethindrone-ethinyl estradiol (LOESTRIN 1/20, 21,) 1-20 MG-MCG per tablet Take 1 tablet by mouth Daily.   Yes Provider, MD Magali   pantoprazole (PROTONIX) 40 MG EC tablet Take 1 tablet by mouth Daily. 3/27/19  Yes Fatou Lin APRN   traZODone (DESYREL) 50 MG tablet Take 1 tablet by mouth Every Night. 11/1/19  Yes Michael Nassar MD   predniSONE (DELTASONE) 10 MG tablet .4 daily times three days, 3 daily times three days, 2 daily times three days,1 daily times three days 11/1/19 12/31/19  Michael Nassar MD     Allergies   Allergen Reactions   • Azithromycin Nausea And Vomiting   • Penicillins GI Intolerance and Nausea Only     Social History     Socioeconomic History   • Marital status: Single     Spouse name: Not on file   • Number of children: Not on file   • Years of education: Not on file   • Highest education level: Not on file   Tobacco Use   • Smoking status: Never Smoker   • Smokeless tobacco: Never Used   Substance and Sexual Activity   • Alcohol use: No   • Drug use: Defer   • Sexual activity: Defer       Review of Systems  Review of Systems   Constitutional: Negative for activity change, appetite change, chills, diaphoresis, fatigue, fever and unexpected weight change.   HENT: Negative for sore throat and trouble swallowing.    Respiratory: Negative for shortness of breath.    Gastrointestinal: Negative for abdominal distention, abdominal pain, anal bleeding, blood in stool, constipation, diarrhea, nausea, rectal pain and vomiting.   Musculoskeletal: Negative for arthralgias.   Skin: Negative for pallor.   Neurological: Negative for light-headedness.        /64 (BP Location: Left arm)   Pulse 84   Ht 152.4 cm (60\")   Wt 44.5 kg (98 lb 3.2 oz)   BMI 19.18 kg/m²     Objective    Physical Exam   Constitutional: She is oriented to " person, place, and time. She appears well-developed and well-nourished. She is cooperative. No distress.   HENT:   Head: Normocephalic and atraumatic.   Neck: Normal range of motion. Neck supple. No thyromegaly present.   Cardiovascular: Normal rate, regular rhythm and normal heart sounds.   Pulmonary/Chest: Effort normal and breath sounds normal. She has no wheezes. She has no rhonchi. She has no rales.   Abdominal: Soft. Normal appearance and bowel sounds are normal. She exhibits no distension. There is no hepatosplenomegaly. There is no tenderness. There is no rigidity and no guarding. No hernia.   Lymphadenopathy:     She has no cervical adenopathy.   Neurological: She is alert and oriented to person, place, and time.   Skin: Skin is warm, dry and intact. No rash noted. No pallor.   Psychiatric: She has a normal mood and affect. Her speech is normal.     Lab on 11/05/2019   Component Date Value Ref Range Status   • Glucose 11/05/2019 77  65 - 99 mg/dL Final   • BUN 11/05/2019 7  6 - 20 mg/dL Final   • Creatinine 11/05/2019 0.63  0.57 - 1.00 mg/dL Final   • Sodium 11/05/2019 141  136 - 145 mmol/L Final   • Potassium 11/05/2019 3.7  3.5 - 5.2 mmol/L Final   • Chloride 11/05/2019 102  98 - 107 mmol/L Final   • CO2 11/05/2019 29.5* 22.0 - 29.0 mmol/L Final   • Calcium 11/05/2019 9.0  8.6 - 10.5 mg/dL Final   • Total Protein 11/05/2019 7.6  6.0 - 8.5 g/dL Final   • Albumin 11/05/2019 4.20  3.50 - 5.20 g/dL Final   • ALT (SGPT) 11/05/2019 10  1 - 33 U/L Final   • AST (SGOT) 11/05/2019 15  1 - 32 U/L Final   • Alkaline Phosphatase 11/05/2019 73  39 - 117 U/L Final   • Total Bilirubin 11/05/2019 0.4  0.2 - 1.2 mg/dL Final   • eGFR Non African Amer 11/05/2019 115  >60 mL/min/1.73 Final   • Globulin 11/05/2019 3.4  gm/dL Final   • A/G Ratio 11/05/2019 1.2  g/dL Final   • BUN/Creatinine Ratio 11/05/2019 11.1  7.0 - 25.0 Final   • Anion Gap 11/05/2019 9.5  5.0 - 15.0 mmol/L Final   • WBC 11/05/2019 10.94* 3.40 - 10.80  10*3/mm3 Final   • RBC 11/05/2019 4.22  3.77 - 5.28 10*6/mm3 Final   • Hemoglobin 11/05/2019 13.6  12.0 - 15.9 g/dL Final   • Hematocrit 11/05/2019 39.4  34.0 - 46.6 % Final   • MCV 11/05/2019 93.4  79.0 - 97.0 fL Final   • MCH 11/05/2019 32.2  26.6 - 33.0 pg Final   • MCHC 11/05/2019 34.5  31.5 - 35.7 g/dL Final   • RDW 11/05/2019 11.8* 12.3 - 15.4 % Final   • RDW-SD 11/05/2019 40.6  37.0 - 54.0 fl Final   • MPV 11/05/2019 9.9  6.0 - 12.0 fL Final   • Platelets 11/05/2019 377  140 - 450 10*3/mm3 Final   • Neutrophil % 11/05/2019 71.2  42.7 - 76.0 % Final   • Lymphocyte % 11/05/2019 18.0* 19.6 - 45.3 % Final   • Monocyte % 11/05/2019 9.8  5.0 - 12.0 % Final   • Eosinophil % 11/05/2019 0.1* 0.3 - 6.2 % Final   • Basophil % 11/05/2019 0.4  0.0 - 1.5 % Final   • Immature Grans % 11/05/2019 0.5  0.0 - 0.5 % Final   • Neutrophils, Absolute 11/05/2019 7.79* 1.70 - 7.00 10*3/mm3 Final   • Lymphocytes, Absolute 11/05/2019 1.97  0.70 - 3.10 10*3/mm3 Final   • Monocytes, Absolute 11/05/2019 1.07* 0.10 - 0.90 10*3/mm3 Final   • Eosinophils, Absolute 11/05/2019 0.01  0.00 - 0.40 10*3/mm3 Final   • Basophils, Absolute 11/05/2019 0.04  0.00 - 0.20 10*3/mm3 Final   • Immature Grans, Absolute 11/05/2019 0.06* 0.00 - 0.05 10*3/mm3 Final   • nRBC 11/05/2019 0.0  0.0 - 0.2 /100 WBC Final     Assessment/Plan      1. Gastroesophageal reflux disease with esophagitis    2. Irritable bowel syndrome with diarrhea    .   Continue Protonix daily and avoidance of gastric irritants.  Currently on no medication for irritable bowel syndrome with diarrhea but contact office if she has flare symptoms will consider restarting antispasmodic or Xifaxan.  Orders placed during this encounter include:  No orders of the defined types were placed in this encounter.      * Surgery not found *    Review and/or summary of lab tests, radiology, procedures, medications. Review and summary of old records and obtaining of history. The risks and benefits of my  recommendations, as well as other treatment options were discussed with the patient today. Questions were answered.    No orders of the defined types were placed in this encounter.      Follow-up: Return in about 1 year (around 12/31/2020).          This document has been electronically signed by DEIDRA Castro on December 31, 2019 10:05 AM             Results for orders placed or performed in visit on 11/05/19   CBC Auto Differential   Result Value Ref Range    WBC 10.94 (H) 3.40 - 10.80 10*3/mm3    RBC 4.22 3.77 - 5.28 10*6/mm3    Hemoglobin 13.6 12.0 - 15.9 g/dL    Hematocrit 39.4 34.0 - 46.6 %    MCV 93.4 79.0 - 97.0 fL    MCH 32.2 26.6 - 33.0 pg    MCHC 34.5 31.5 - 35.7 g/dL    RDW 11.8 (L) 12.3 - 15.4 %    RDW-SD 40.6 37.0 - 54.0 fl    MPV 9.9 6.0 - 12.0 fL    Platelets 377 140 - 450 10*3/mm3    Neutrophil % 71.2 42.7 - 76.0 %    Lymphocyte % 18.0 (L) 19.6 - 45.3 %    Monocyte % 9.8 5.0 - 12.0 %    Eosinophil % 0.1 (L) 0.3 - 6.2 %    Basophil % 0.4 0.0 - 1.5 %    Immature Grans % 0.5 0.0 - 0.5 %    Neutrophils, Absolute 7.79 (H) 1.70 - 7.00 10*3/mm3    Lymphocytes, Absolute 1.97 0.70 - 3.10 10*3/mm3    Monocytes, Absolute 1.07 (H) 0.10 - 0.90 10*3/mm3    Eosinophils, Absolute 0.01 0.00 - 0.40 10*3/mm3    Basophils, Absolute 0.04 0.00 - 0.20 10*3/mm3    Immature Grans, Absolute 0.06 (H) 0.00 - 0.05 10*3/mm3    nRBC 0.0 0.0 - 0.2 /100 WBC   Comprehensive metabolic panel   Result Value Ref Range    Glucose 77 65 - 99 mg/dL    BUN 7 6 - 20 mg/dL    Creatinine 0.63 0.57 - 1.00 mg/dL    Sodium 141 136 - 145 mmol/L    Potassium 3.7 3.5 - 5.2 mmol/L    Chloride 102 98 - 107 mmol/L    CO2 29.5 (H) 22.0 - 29.0 mmol/L    Calcium 9.0 8.6 - 10.5 mg/dL    Total Protein 7.6 6.0 - 8.5 g/dL    Albumin 4.20 3.50 - 5.20 g/dL    ALT (SGPT) 10 1 - 33 U/L    AST (SGOT) 15 1 - 32 U/L    Alkaline Phosphatase 73 39 - 117 U/L    Total Bilirubin 0.4 0.2 - 1.2 mg/dL    eGFR Non African Amer 115 >60 mL/min/1.73    Globulin 3.4 gm/dL     A/G Ratio 1.2 g/dL    BUN/Creatinine Ratio 11.1 7.0 - 25.0    Anion Gap 9.5 5.0 - 15.0 mmol/L   Results for orders placed or performed in visit on 03/01/19   CBC Auto Differential   Result Value Ref Range    WBC 5.72 3.40 - 10.80 10*3/mm3    RBC 4.07 3.77 - 5.28 10*6/mm3    Hemoglobin 13.5 12.0 - 15.9 g/dL    Hematocrit 37.5 34.0 - 46.6 %    MCV 92.1 79.0 - 97.0 fL    MCH 33.2 (H) 26.6 - 33.0 pg    MCHC 36.0 (H) 31.5 - 35.7 g/dL    RDW 11.8 (L) 12.3 - 15.4 %    RDW-SD 39.7 37.0 - 54.0 fl    MPV 9.7 6.0 - 12.0 fL    Platelets 385 140 - 450 10*3/mm3    Neutrophil % 53.7 42.7 - 76.0 %    Lymphocyte % 35.5 19.6 - 45.3 %    Monocyte % 7.0 5.0 - 12.0 %    Eosinophil % 2.4 0.3 - 6.2 %    Basophil % 0.9 0.0 - 1.5 %    Immature Grans % 0.5 0.0 - 0.5 %    Neutrophils, Absolute 3.07 1.40 - 7.00 10*3/mm3    Lymphocytes, Absolute 2.03 0.70 - 3.10 10*3/mm3    Monocytes, Absolute 0.40 0.10 - 0.90 10*3/mm3    Eosinophils, Absolute 0.14 0.00 - 0.40 10*3/mm3    Basophils, Absolute 0.05 0.00 - 0.20 10*3/mm3    Immature Grans, Absolute 0.03 0.00 - 0.05 10*3/mm3    nRBC 0.0 0.0 - 0.0 /100 WBC   Comprehensive metabolic panel   Result Value Ref Range    Glucose 81 60 - 100 mg/dL    BUN 8 7 - 21 mg/dL    Creatinine 0.57 0.50 - 1.00 mg/dL    Sodium 138 137 - 145 mmol/L    Potassium 4.1 3.5 - 5.1 mmol/L    Chloride 100 95 - 110 mmol/L    CO2 28.0 22.0 - 31.0 mmol/L    Calcium 9.3 8.4 - 10.2 mg/dL    Total Protein 7.6 6.3 - 8.6 g/dL    Albumin 4.30 3.40 - 4.80 g/dL    ALT (SGPT) 29 9 - 52 U/L    AST (SGOT) 55 (H) 14 - 36 U/L    Alkaline Phosphatase 102 38 - 126 U/L    Total Bilirubin 0.9 0.2 - 1.3 mg/dL    eGFR Non  Amer 129 71 - 165 mL/min/1.73    Globulin 3.3 2.3 - 3.5 gm/dL    A/G Ratio 1.3 1.1 - 1.8 g/dL    BUN/Creatinine Ratio 14.0 7.0 - 25.0    Anion Gap 10.0 5.0 - 15.0 mmol/L   Results for orders placed or performed in visit on 01/30/19   CBC Auto Differential   Result Value Ref Range    WBC 6.93 3.20 - 9.80 10*3/mm3    RBC  4.29 3.77 - 5.16 10*6/mm3    Hemoglobin 14.2 12.0 - 15.5 g/dL    Hematocrit 39.2 35.0 - 45.0 %    MCV 91.4 80.0 - 98.0 fL    MCH 33.1 26.5 - 34.0 pg    MCHC 36.2 (H) 31.4 - 36.0 g/dL    RDW 11.9 11.5 - 14.5 %    RDW-SD 40.0 36.4 - 46.3 fl    MPV 9.4 8.0 - 12.0 fL    Platelets 357 150 - 450 10*3/mm3    Neutrophil % 47.9 37.0 - 80.0 %    Lymphocyte % 39.0 10.0 - 50.0 %    Monocyte % 8.7 0.0 - 12.0 %    Eosinophil % 3.2 0.0 - 7.0 %    Basophil % 0.6 0.0 - 2.0 %    Immature Grans % 0.6 (H) 0.0 - 0.5 %    Neutrophils, Absolute 3.33 2.00 - 8.60 10*3/mm3    Lymphocytes, Absolute 2.70 0.60 - 4.20 10*3/mm3    Monocytes, Absolute 0.60 0.00 - 0.90 10*3/mm3    Eosinophils, Absolute 0.22 0.00 - 0.70 10*3/mm3    Basophils, Absolute 0.04 0.00 - 0.20 10*3/mm3    Immature Grans, Absolute 0.04 (H) 0.00 - 0.02 10*3/mm3     *Note: Due to a large number of results and/or encounters for the requested time period, some results have not been displayed. A complete set of results can be found in Results Review.

## 2019-12-31 NOTE — PATIENT INSTRUCTIONS

## 2020-01-08 ENCOUNTER — OFFICE VISIT (OUTPATIENT)
Dept: FAMILY MEDICINE CLINIC | Facility: CLINIC | Age: 25
End: 2020-01-08

## 2020-01-08 VITALS
SYSTOLIC BLOOD PRESSURE: 112 MMHG | HEIGHT: 60 IN | RESPIRATION RATE: 18 BRPM | OXYGEN SATURATION: 99 % | BODY MASS INDEX: 19.1 KG/M2 | HEART RATE: 98 BPM | WEIGHT: 97.3 LBS | DIASTOLIC BLOOD PRESSURE: 68 MMHG

## 2020-01-08 DIAGNOSIS — G47.00 INSOMNIA, UNSPECIFIED TYPE: ICD-10-CM

## 2020-01-08 DIAGNOSIS — G47.9 SLEEP DISTURBANCE: Primary | ICD-10-CM

## 2020-01-08 PROCEDURE — 99213 OFFICE O/P EST LOW 20 MIN: CPT | Performed by: FAMILY MEDICINE

## 2020-01-08 RX ORDER — ESZOPICLONE 1 MG/1
1 TABLET, FILM COATED ORAL NIGHTLY
Qty: 30 TABLET | Refills: 1 | Status: SHIPPED | OUTPATIENT
Start: 2020-01-08 | End: 2020-05-01

## 2020-01-08 NOTE — PROGRESS NOTES
"Subjective   Arlette Zaidi is a 24 y.o. female.    cc: difficulty sleeping  HPI The patient comes in with difficulties remaining asleep. She will wake up around two in the morning and she has difficulty getting back to sleep.    The following portions of the patient's history were reviewed and updated as appropriate: allergies, current medications, past family history, past medical history, past social history, past surgical history and problem list.    Review of Systems   Constitutional: Negative for fatigue and fever.   Psychiatric/Behavioral: Positive for sleep disturbance.       Objective   Physical Exam   Constitutional: She is oriented to person, place, and time. She appears well-developed and well-nourished.   HENT:   Head: Normocephalic and atraumatic.   Right Ear: External ear normal.   Left Ear: External ear normal.   Nose: Nose normal.   Mouth/Throat: Oropharynx is clear and moist.   Eyes: Conjunctivae are normal.   Neck: Normal range of motion.   Cardiovascular: Normal rate, regular rhythm and normal heart sounds. Exam reveals no gallop and no friction rub.   No murmur heard.  Pulmonary/Chest: Effort normal and breath sounds normal. No stridor. No respiratory distress. She has no wheezes. She has no rales.   Abdominal: Soft. Bowel sounds are normal. She exhibits no distension. There is no tenderness. There is no guarding.   Neurological: She is alert and oriented to person, place, and time.   Skin: Skin is warm and dry.   Vitals reviewed.        Visit Vitals  /68 (BP Location: Left arm, Patient Position: Sitting, Cuff Size: Adult)   Pulse 98   Resp 18   Ht 152.4 cm (60\")   Wt 44.1 kg (97 lb 4.8 oz)   SpO2 99%   BMI 19.00 kg/m²     Body mass index is 19 kg/m².      Assessment/Plan   Arlette was seen today for insomnia.    Diagnoses and all orders for this visit:    Sleep disturbance  -     eszopiclone (LUNESTA) 1 MG tablet; Take 1 tablet by mouth Every Night. Take immediately before " bedtime    Insomnia, unspecified type  -     eszopiclone (LUNESTA) 1 MG tablet; Take 1 tablet by mouth Every Night. Take immediately before bedtime    [id number] is appropriate.  Return to the clinic in 1 month/s.  Will contact with results as needed.  ]

## 2020-04-01 RX ORDER — PANTOPRAZOLE SODIUM 40 MG/1
40 TABLET, DELAYED RELEASE ORAL DAILY
Qty: 30 TABLET | Refills: 11 | Status: SHIPPED | OUTPATIENT
Start: 2020-04-01 | End: 2021-01-05 | Stop reason: SDUPTHER

## 2020-05-01 DIAGNOSIS — G47.9 SLEEP DISTURBANCE: ICD-10-CM

## 2020-05-01 DIAGNOSIS — G47.00 INSOMNIA, UNSPECIFIED TYPE: ICD-10-CM

## 2020-05-01 RX ORDER — ESZOPICLONE 1 MG/1
TABLET, FILM COATED ORAL
Qty: 30 TABLET | Refills: 0 | Status: SHIPPED | OUTPATIENT
Start: 2020-05-01 | End: 2021-01-05

## 2020-07-20 RX ORDER — TRAZODONE HYDROCHLORIDE 50 MG/1
50 TABLET ORAL NIGHTLY
Qty: 90 TABLET | Refills: 1 | Status: SHIPPED | OUTPATIENT
Start: 2020-07-20 | End: 2020-08-10 | Stop reason: DRUGHIGH

## 2020-08-10 ENCOUNTER — OFFICE VISIT (OUTPATIENT)
Dept: FAMILY MEDICINE CLINIC | Facility: CLINIC | Age: 25
End: 2020-08-10

## 2020-08-10 VITALS
DIASTOLIC BLOOD PRESSURE: 72 MMHG | OXYGEN SATURATION: 99 % | SYSTOLIC BLOOD PRESSURE: 110 MMHG | HEIGHT: 60 IN | HEART RATE: 91 BPM | WEIGHT: 97.1 LBS | BODY MASS INDEX: 19.06 KG/M2

## 2020-08-10 DIAGNOSIS — K21.9 GASTROESOPHAGEAL REFLUX DISEASE, ESOPHAGITIS PRESENCE NOT SPECIFIED: Primary | ICD-10-CM

## 2020-08-10 DIAGNOSIS — G47.9 SLEEP DISTURBANCE: ICD-10-CM

## 2020-08-10 PROCEDURE — 99214 OFFICE O/P EST MOD 30 MIN: CPT | Performed by: FAMILY MEDICINE

## 2020-08-10 RX ORDER — ALBUTEROL SULFATE 90 UG/1
AEROSOL, METERED RESPIRATORY (INHALATION)
COMMUNITY
Start: 2019-11-09

## 2020-08-10 RX ORDER — TRAZODONE HYDROCHLORIDE 100 MG/1
100 TABLET ORAL NIGHTLY
Qty: 30 TABLET | Refills: 2 | Status: SHIPPED | OUTPATIENT
Start: 2020-08-10 | End: 2021-01-05

## 2020-08-10 RX ORDER — NORETHINDRONE ACETATE AND ETHINYL ESTRADIOL AND FERROUS FUMARATE 1MG-20(24)
KIT ORAL
COMMUNITY
Start: 2020-01-21 | End: 2021-01-05

## 2020-08-10 NOTE — PROGRESS NOTES
"Subjective   Arlette Zaidi is a 25 y.o. female.   Cc: follow up of chronic medical issues    Heartburn   She reports no abdominal pain, no belching, no chest pain, no choking, no coughing, no dysphagia, no early satiety, no heartburn, no hoarse voice, no nausea, no sore throat, no stridor, no water brash or no wheezing.   Sleep Disturbance- She continues to wake up in the middle of the night. This occurs four times a week. She is on 50 of of Trazodone.     The following portions of the patient's history were reviewed and updated as appropriate: allergies, current medications, past family history, past medical history, past social history, past surgical history and problem list.    Review of Systems   HENT: Negative for hoarse voice and sore throat.    Respiratory: Negative for cough, choking and wheezing.    Cardiovascular: Negative for chest pain.   Gastrointestinal: Negative for abdominal pain, dysphagia, heartburn and nausea.       Objective   Physical Exam   Constitutional: She appears well-developed and well-nourished.   HENT:   Head: Normocephalic and atraumatic.   Right Ear: External ear normal.   Left Ear: External ear normal.   Nose: Nose normal.   Mouth/Throat: Oropharynx is clear and moist.   Eyes: Pupils are equal, round, and reactive to light.   Neck: Normal range of motion.   Cardiovascular: Normal rate, regular rhythm and normal heart sounds. Exam reveals no gallop and no friction rub.   No murmur heard.  Pulmonary/Chest: Effort normal and breath sounds normal. No stridor. No respiratory distress. She has no wheezes. She has no rales.   Abdominal: Soft. Bowel sounds are normal. She exhibits no distension. There is no tenderness. There is no guarding.   Neurological: She is alert.   Skin: Skin is warm and dry.   Vitals reviewed.        Visit Vitals  /72 (BP Location: Left arm, Patient Position: Sitting, Cuff Size: Adult)   Pulse 91   Ht 152.4 cm (60\")   Wt 44 kg (97 lb 1.6 oz)   SpO2 " 99%   BMI 18.96 kg/m²     Body mass index is 18.96 kg/m².      Assessment/Plan   Arlette was seen today for follow-up.    Diagnoses and all orders for this visit:    Gastroesophageal reflux disease, esophagitis presence not specified    Sleep disturbance    Other orders  -     traZODone (DESYREL) 100 MG tablet; Take 1 tablet by mouth Every Night.    Return to the clinic in 3 month/s.  Will contact with results as needed.

## 2021-01-05 ENCOUNTER — OFFICE VISIT (OUTPATIENT)
Dept: GASTROENTEROLOGY | Facility: CLINIC | Age: 26
End: 2021-01-05

## 2021-01-05 VITALS
HEART RATE: 82 BPM | SYSTOLIC BLOOD PRESSURE: 122 MMHG | HEIGHT: 60 IN | WEIGHT: 109.2 LBS | DIASTOLIC BLOOD PRESSURE: 80 MMHG | BODY MASS INDEX: 21.44 KG/M2

## 2021-01-05 DIAGNOSIS — K21.00 GASTROESOPHAGEAL REFLUX DISEASE WITH ESOPHAGITIS WITHOUT HEMORRHAGE: Primary | ICD-10-CM

## 2021-01-05 PROCEDURE — 99213 OFFICE O/P EST LOW 20 MIN: CPT | Performed by: NURSE PRACTITIONER

## 2021-01-05 RX ORDER — DOXEPIN HYDROCHLORIDE 25 MG/1
25 CAPSULE ORAL NIGHTLY
COMMUNITY
End: 2021-12-20

## 2021-01-05 RX ORDER — PANTOPRAZOLE SODIUM 40 MG/1
40 TABLET, DELAYED RELEASE ORAL DAILY
Qty: 30 TABLET | Refills: 11 | Status: SHIPPED | OUTPATIENT
Start: 2021-01-05 | End: 2021-04-12

## 2021-01-05 NOTE — PROGRESS NOTES
"Chief Complaint  Abdominal Pain and Diarrhea    Subjective          Arlette Mendoza presents to [facility] for   Abdominal Pain  Associated symptoms include diarrhea. Her past medical history is significant for GERD.   Diarrhea   Associated symptoms include abdominal pain.   Heartburn  She complains of abdominal pain.   Patient reports he has done very well got  last year.  Symptoms are well controlled diet modifications along with Protonix daily.  Has not required any medication for IBS-D in some time.  Reports occasional flare of diarrhea though reports is rare and has had any melena or hematochezia.    Objective   Vital Signs:   /80 (BP Location: Left arm)   Pulse 82   Ht 152.4 cm (60\")   Wt 49.5 kg (109 lb 3.2 oz)   BMI 21.33 kg/m²     Physical Exam  Vitals signs reviewed.   Constitutional:       Appearance: Normal appearance. She is normal weight.   HENT:      Head: Normocephalic and atraumatic.   Abdominal:      General: There is no distension.      Palpations: Abdomen is soft.      Tenderness: There is abdominal tenderness in the left upper quadrant.   Neurological:      Mental Status: She is alert.        Result Review :       Data reviewed: GI studies EGD and Colon in 2018           Assessment and Plan    Problem List Items Addressed This Visit     None      Visit Diagnoses     Gastroesophageal reflux disease with esophagitis without hemorrhage    -  Primary    Relevant Medications    pantoprazole (PROTONIX) 40 MG EC tablet          Follow Up   Return in about 1 year (around 1/5/2022).  Patient was given instructions and counseling regarding her condition or for health maintenance advice. Please see specific information pulled into the AVS if appropriate.       "

## 2021-01-05 NOTE — PATIENT INSTRUCTIONS
Food Choices for Gastroesophageal Reflux Disease, Adult  When you have gastroesophageal reflux disease (GERD), the foods you eat and your eating habits are very important. Choosing the right foods can help ease your discomfort. Think about working with a nutrition specialist (dietitian) to help you make good choices.  What are tips for following this plan?    Meals  · Choose healthy foods that are low in fat, such as fruits, vegetables, whole grains, low-fat dairy products, and lean meat, fish, and poultry.  · Eat small meals often instead of 3 large meals a day. Eat your meals slowly, and in a place where you are relaxed. Avoid bending over or lying down until 2-3 hours after eating.  · Avoid eating meals 2-3 hours before bed.  · Avoid drinking a lot of liquid with meals.  · Cook foods using methods other than frying. Bake, grill, or broil food instead.  · Avoid or limit:  ? Chocolate.  ? Peppermint or spearmint.  ? Alcohol.  ? Pepper.  ? Black and decaffeinated coffee.  ? Black and decaffeinated tea.  ? Bubbly (carbonated) soft drinks.  ? Caffeinated energy drinks and soft drinks.  · Limit high-fat foods such as:  ? Fatty meat or fried foods.  ? Whole milk, cream, butter, or ice cream.  ? Nuts and nut butters.  ? Pastries, donuts, and sweets made with butter or shortening.  · Avoid foods that cause symptoms. These foods may be different for everyone. Common foods that cause symptoms include:  ? Tomatoes.  ? Oranges, johana, and limes.  ? Peppers.  ? Spicy food.  ? Onions and garlic.  ? Vinegar.  Lifestyle  · Maintain a healthy weight. Ask your doctor what weight is healthy for you. If you need to lose weight, work with your doctor to do so safely.  · Exercise for at least 30 minutes for 5 or more days each week, or as told by your doctor.  · Wear loose-fitting clothes.  · Do not smoke. If you need help quitting, ask your doctor.  · Sleep with the head of your bed higher than your feet. Use a wedge under the  mattress or blocks under the bed frame to raise the head of the bed.  Summary  · When you have gastroesophageal reflux disease (GERD), food and lifestyle choices are very important in easing your symptoms.  · Eat small meals often instead of 3 large meals a day. Eat your meals slowly, and in a place where you are relaxed.  · Limit high-fat foods such as fatty meat or fried foods.  · Avoid bending over or lying down until 2-3 hours after eating.  · Avoid peppermint and spearmint, caffeine, alcohol, and chocolate.  This information is not intended to replace advice given to you by your health care provider. Make sure you discuss any questions you have with your health care provider.  Document Revised: 04/09/2020 Document Reviewed: 01/23/2018  Elsevier Patient Education © 2020 [employer]

## 2021-04-12 RX ORDER — PANTOPRAZOLE SODIUM 40 MG/1
40 TABLET, DELAYED RELEASE ORAL DAILY
Qty: 30 TABLET | Refills: 11 | Status: SHIPPED | OUTPATIENT
Start: 2021-04-12 | End: 2022-01-18

## 2021-12-20 ENCOUNTER — OFFICE VISIT (OUTPATIENT)
Dept: GASTROENTEROLOGY | Facility: CLINIC | Age: 26
End: 2021-12-20

## 2021-12-20 VITALS
BODY MASS INDEX: 23.16 KG/M2 | HEART RATE: 95 BPM | SYSTOLIC BLOOD PRESSURE: 122 MMHG | HEIGHT: 60 IN | WEIGHT: 118 LBS | DIASTOLIC BLOOD PRESSURE: 77 MMHG

## 2021-12-20 DIAGNOSIS — K58.0 IRRITABLE BOWEL SYNDROME WITH DIARRHEA: ICD-10-CM

## 2021-12-20 DIAGNOSIS — K21.00 GASTROESOPHAGEAL REFLUX DISEASE WITH ESOPHAGITIS WITHOUT HEMORRHAGE: Primary | ICD-10-CM

## 2021-12-20 PROBLEM — J30.2 SEASONAL ALLERGIES: Status: ACTIVE | Noted: 2021-12-20

## 2021-12-20 PROBLEM — J30.9 ALLERGIC RHINITIS: Status: ACTIVE | Noted: 2021-12-20

## 2021-12-20 PROCEDURE — 99213 OFFICE O/P EST LOW 20 MIN: CPT | Performed by: NURSE PRACTITIONER

## 2021-12-20 RX ORDER — DOXEPIN HYDROCHLORIDE 50 MG/1
CAPSULE ORAL
COMMUNITY
Start: 2021-12-18

## 2021-12-20 NOTE — PROGRESS NOTES
Chief Complaint   Patient presents with   • Abdominal Pain   • Diarrhea   • Heartburn       Subjective    Arlette Mendoza is a 26 y.o. female. she is here today for follow-up.    History of Present Illness  26-year-old female presents for follow-up regarding irritable bowel syndrome diarrhea predominant and GERD.  Reports over the last year she has done very well she is living in [place] working in [place] as a .  Denies any recent flare symptoms she reports some intermittent diarrhea but has not impacted her daily living she has not had to take dicyclomine or any medication in some time.  Reflux is well controlled Protonix diet lifestyle modifications.       The following portions of the patient's history were reviewed and updated as appropriate:   Past Medical History:   Diagnosis Date   • Acute laryngitis    • Allergic rhinitis due to pollen    • Asthma     Asthmatic tendency      • Cough    • Cyst of ovary    • Encounter for initial prescription of contraceptive pills    • Encounter for surveillance of contraceptive pills    • Health maintenance examination    • Nonspecific reaction to tuberculin skin test without active tuberculosis     Negatiave   • Underimmunization status    • Upper respiratory infection    • Vaccination required    • Visit for gynecologic examination    • Well child visit      Past Surgical History:   Procedure Laterality Date   • CHOLECYSTECTOMY     • COLONOSCOPY N/A 11/15/2018    Procedure: COLONOSCOPY;  Surgeon: Jaiyeola, Aderemi, MD;  Location: [facility] ENDOSCOPY;  Service: Gastroenterology   • ENDOSCOPY N/A 11/15/2018    Procedure: ESOPHAGOGASTRODUODENOSCOPY;  Surgeon: Jaiyeola, Aderemi, MD;  Location: [facility] ENDOSCOPY;  Service: Gastroenterology   • INJECTION OF MEDICATION  04/17/2015    Celestone (betamethasone) (3)    • INJECTION OF MEDICATION  01/18/2016    Depo Medrol (Methylprednisone) (9)      Family History   Problem Relation Age of Onset   •  "Ulcerative colitis Maternal Uncle    • Ulcerative colitis Maternal Grandmother      OB History    No obstetric history on file.       Prior to Admission medications    Medication Sig Start Date End Date Taking? Authorizing Provider   albuterol sulfate  (90 Base) MCG/ACT inhaler INL 2 PFS ITL Q 4 H FOR UP TO 14 DAYS PRF WHZ OR SOB 11/9/19  Yes Magali Cortez MD   MULTIPLE VITAMINS-MINERALS PO Take  by mouth.   Yes Magali Cortez MD   pantoprazole (PROTONIX) 40 MG EC tablet TAKE 1 TABLET BY MOUTH DAILY 4/12/21  Yes Fatou Lin APRN   doxepin (SINEquan) 25 MG capsule Take 25 mg by mouth Every Night.  12/20/21 Yes Magali Cortez MD   doxepin (SINEquan) 50 MG capsule  12/18/21   ProviderMagali MD   norethindrone-ethinyl estradiol (LOESTRIN 1/20, 21,) 1-20 MG-MCG per tablet Take 1 tablet by mouth Daily.  12/20/21  Magali Cortez MD     Allergies   Allergen Reactions   • Azithromycin Nausea And Vomiting   • Doxycycline Rash   • Penicillins GI Intolerance and Nausea Only     Social History     Socioeconomic History   • Marital status: Single   Tobacco Use   • Smoking status: Never Smoker   • Smokeless tobacco: Never Used   Substance and Sexual Activity   • Alcohol use: No   • Drug use: Defer   • Sexual activity: Defer       Review of Systems  Review of Systems   Constitutional: Negative for activity change, appetite change, chills, diaphoresis, fatigue, fever and unexpected weight change.   HENT: Negative for sore throat and trouble swallowing.    Respiratory: Negative for shortness of breath.    Gastrointestinal: Positive for abdominal pain. Negative for abdominal distention, anal bleeding, blood in stool, constipation, diarrhea, nausea, rectal pain and vomiting.   Musculoskeletal: Negative for arthralgias.   Skin: Negative for pallor.   Neurological: Negative for light-headedness.        /77 (BP Location: Left arm)   Pulse 95   Ht 152.4 cm (60\")   Wt 53.5 kg (118 lb)   " BMI 23.05 kg/m²     Objective    Physical Exam  Constitutional:       General: She is not in acute distress.     Appearance: Normal appearance. She is normal weight. She is not ill-appearing.   Pulmonary:      Effort: Pulmonary effort is normal.   Abdominal:      General: Bowel sounds are normal. There is no distension.      Palpations: Abdomen is soft. There is no mass.      Tenderness: There is no abdominal tenderness.   Neurological:      Mental Status: She is alert.       Lab on 11/05/2019   Component Date Value Ref Range Status   • Glucose 11/05/2019 77  65 - 99 mg/dL Final   • BUN 11/05/2019 7  6 - 20 mg/dL Final   • Creatinine 11/05/2019 0.63  0.57 - 1.00 mg/dL Final   • Sodium 11/05/2019 141  136 - 145 mmol/L Final   • Potassium 11/05/2019 3.7  3.5 - 5.2 mmol/L Final   • Chloride 11/05/2019 102  98 - 107 mmol/L Final   • CO2 11/05/2019 29.5* 22.0 - 29.0 mmol/L Final   • Calcium 11/05/2019 9.0  8.6 - 10.5 mg/dL Final   • Total Protein 11/05/2019 7.6  6.0 - 8.5 g/dL Final   • Albumin 11/05/2019 4.20  3.50 - 5.20 g/dL Final   • ALT (SGPT) 11/05/2019 10  1 - 33 U/L Final   • AST (SGOT) 11/05/2019 15  1 - 32 U/L Final   • Alkaline Phosphatase 11/05/2019 73  39 - 117 U/L Final   • Total Bilirubin 11/05/2019 0.4  0.2 - 1.2 mg/dL Final   • eGFR Non African Amer 11/05/2019 115  >60 mL/min/1.73 Final   • Globulin 11/05/2019 3.4  gm/dL Final   • A/G Ratio 11/05/2019 1.2  g/dL Final   • BUN/Creatinine Ratio 11/05/2019 11.1  7.0 - 25.0 Final   • Anion Gap 11/05/2019 9.5  5.0 - 15.0 mmol/L Final   • WBC 11/05/2019 10.94* 3.40 - 10.80 10*3/mm3 Final   • RBC 11/05/2019 4.22  3.77 - 5.28 10*6/mm3 Final   • Hemoglobin 11/05/2019 13.6  12.0 - 15.9 g/dL Final   • Hematocrit 11/05/2019 39.4  34.0 - 46.6 % Final   • MCV 11/05/2019 93.4  79.0 - 97.0 fL Final   • MCH 11/05/2019 32.2  26.6 - 33.0 pg Final   • MCHC 11/05/2019 34.5  31.5 - 35.7 g/dL Final   • RDW 11/05/2019 11.8* 12.3 - 15.4 % Final   • RDW-SD 11/05/2019 40.6  37.0 -  54.0 fl Final   • MPV 11/05/2019 9.9  6.0 - 12.0 fL Final   • Platelets 11/05/2019 377  140 - 450 10*3/mm3 Final   • Neutrophil % 11/05/2019 71.2  42.7 - 76.0 % Final   • Lymphocyte % 11/05/2019 18.0* 19.6 - 45.3 % Final   • Monocyte % 11/05/2019 9.8  5.0 - 12.0 % Final   • Eosinophil % 11/05/2019 0.1* 0.3 - 6.2 % Final   • Basophil % 11/05/2019 0.4  0.0 - 1.5 % Final   • Immature Grans % 11/05/2019 0.5  0.0 - 0.5 % Final   • Neutrophils, Absolute 11/05/2019 7.79* 1.70 - 7.00 10*3/mm3 Final   • Lymphocytes, Absolute 11/05/2019 1.97  0.70 - 3.10 10*3/mm3 Final   • Monocytes, Absolute 11/05/2019 1.07* 0.10 - 0.90 10*3/mm3 Final   • Eosinophils, Absolute 11/05/2019 0.01  0.00 - 0.40 10*3/mm3 Final   • Basophils, Absolute 11/05/2019 0.04  0.00 - 0.20 10*3/mm3 Final   • Immature Grans, Absolute 11/05/2019 0.06* 0.00 - 0.05 10*3/mm3 Final   • nRBC 11/05/2019 0.0  0.0 - 0.2 /100 WBC Final     Assessment/Plan      1. Gastroesophageal reflux disease with esophagitis without hemorrhage    2. Irritable bowel syndrome with diarrhea    .   Continue Protonix daily avoid gastric irritants follow standard antireflux measures.  Continue diet and lifestyle modifications.  Contact office if symptoms become severe or worsen.    Orders placed during this encounter include:  No orders of the defined types were placed in this encounter.      * Surgery not found *    Review and/or summary of lab tests, radiology, procedures, medications. Review and summary of old records and obtaining of history. The risks and benefits of my recommendations, as well as other treatment options were discussed with the patient today. Questions were answered.    No orders of the defined types were placed in this encounter.      Follow-up: Return in about 1 year (around 12/20/2022).          This document has been electronically signed by DEIDRA Castro on December 21, 2021 17:08 CST           I spent 17 minutes caring for Arlette on this date of service.  This time includes time spent by me in the following activities:preparing for the visit, reviewing tests, obtaining and/or reviewing a separately obtained history, performing a medically appropriate examination and/or evaluation , counseling and educating the patient/family/caregiver, ordering medications, tests, or procedures, referring and communicating with other health care professionals , documenting information in the medical record and care coordination    Results for orders placed or performed in visit on 11/05/19   CBC Auto Differential    Specimen: Blood   Result Value Ref Range    WBC 10.94 (H) 3.40 - 10.80 10*3/mm3    RBC 4.22 3.77 - 5.28 10*6/mm3    Hemoglobin 13.6 12.0 - 15.9 g/dL    Hematocrit 39.4 34.0 - 46.6 %    MCV 93.4 79.0 - 97.0 fL    MCH 32.2 26.6 - 33.0 pg    MCHC 34.5 31.5 - 35.7 g/dL    RDW 11.8 (L) 12.3 - 15.4 %    RDW-SD 40.6 37.0 - 54.0 fl    MPV 9.9 6.0 - 12.0 fL    Platelets 377 140 - 450 10*3/mm3    Neutrophil % 71.2 42.7 - 76.0 %    Lymphocyte % 18.0 (L) 19.6 - 45.3 %    Monocyte % 9.8 5.0 - 12.0 %    Eosinophil % 0.1 (L) 0.3 - 6.2 %    Basophil % 0.4 0.0 - 1.5 %    Immature Grans % 0.5 0.0 - 0.5 %    Neutrophils, Absolute 7.79 (H) 1.70 - 7.00 10*3/mm3    Lymphocytes, Absolute 1.97 0.70 - 3.10 10*3/mm3    Monocytes, Absolute 1.07 (H) 0.10 - 0.90 10*3/mm3    Eosinophils, Absolute 0.01 0.00 - 0.40 10*3/mm3    Basophils, Absolute 0.04 0.00 - 0.20 10*3/mm3    Immature Grans, Absolute 0.06 (H) 0.00 - 0.05 10*3/mm3    nRBC 0.0 0.0 - 0.2 /100 WBC   Comprehensive metabolic panel    Specimen: Blood   Result Value Ref Range    Glucose 77 65 - 99 mg/dL    BUN 7 6 - 20 mg/dL    Creatinine 0.63 0.57 - 1.00 mg/dL    Sodium 141 136 - 145 mmol/L    Potassium 3.7 3.5 - 5.2 mmol/L    Chloride 102 98 - 107 mmol/L    CO2 29.5 (H) 22.0 - 29.0 mmol/L    Calcium 9.0 8.6 - 10.5 mg/dL    Total Protein 7.6 6.0 - 8.5 g/dL    Albumin 4.20 3.50 - 5.20 g/dL    ALT (SGPT) 10 1 - 33 U/L    AST (SGOT) 15 1 - 32 U/L     Alkaline Phosphatase 73 39 - 117 U/L    Total Bilirubin 0.4 0.2 - 1.2 mg/dL    eGFR Non African Amer 115 >60 mL/min/1.73    Globulin 3.4 gm/dL    A/G Ratio 1.2 g/dL    BUN/Creatinine Ratio 11.1 7.0 - 25.0    Anion Gap 9.5 5.0 - 15.0 mmol/L   Results for orders placed or performed in visit on 03/01/19   CBC Auto Differential    Specimen: Blood   Result Value Ref Range    WBC 5.72 3.40 - 10.80 10*3/mm3    RBC 4.07 3.77 - 5.28 10*6/mm3    Hemoglobin 13.5 12.0 - 15.9 g/dL    Hematocrit 37.5 34.0 - 46.6 %    MCV 92.1 79.0 - 97.0 fL    MCH 33.2 (H) 26.6 - 33.0 pg    MCHC 36.0 (H) 31.5 - 35.7 g/dL    RDW 11.8 (L) 12.3 - 15.4 %    RDW-SD 39.7 37.0 - 54.0 fl    MPV 9.7 6.0 - 12.0 fL    Platelets 385 140 - 450 10*3/mm3    Neutrophil % 53.7 42.7 - 76.0 %    Lymphocyte % 35.5 19.6 - 45.3 %    Monocyte % 7.0 5.0 - 12.0 %    Eosinophil % 2.4 0.3 - 6.2 %    Basophil % 0.9 0.0 - 1.5 %    Immature Grans % 0.5 0.0 - 0.5 %    Neutrophils, Absolute 3.07 1.40 - 7.00 10*3/mm3    Lymphocytes, Absolute 2.03 0.70 - 3.10 10*3/mm3    Monocytes, Absolute 0.40 0.10 - 0.90 10*3/mm3    Eosinophils, Absolute 0.14 0.00 - 0.40 10*3/mm3    Basophils, Absolute 0.05 0.00 - 0.20 10*3/mm3    Immature Grans, Absolute 0.03 0.00 - 0.05 10*3/mm3    nRBC 0.0 0.0 - 0.0 /100 WBC   Comprehensive metabolic panel    Specimen: Blood   Result Value Ref Range    Glucose 81 60 - 100 mg/dL    BUN 8 7 - 21 mg/dL    Creatinine 0.57 0.50 - 1.00 mg/dL    Sodium 138 137 - 145 mmol/L    Potassium 4.1 3.5 - 5.1 mmol/L    Chloride 100 95 - 110 mmol/L    CO2 28.0 22.0 - 31.0 mmol/L    Calcium 9.3 8.4 - 10.2 mg/dL    Total Protein 7.6 6.3 - 8.6 g/dL    Albumin 4.30 3.40 - 4.80 g/dL    ALT (SGPT) 29 9 - 52 U/L    AST (SGOT) 55 (H) 14 - 36 U/L    Alkaline Phosphatase 102 38 - 126 U/L    Total Bilirubin 0.9 0.2 - 1.3 mg/dL    eGFR Non  Amer 129 71 - 165 mL/min/1.73    Globulin 3.3 2.3 - 3.5 gm/dL    A/G Ratio 1.3 1.1 - 1.8 g/dL    BUN/Creatinine Ratio 14.0 7.0 - 25.0     Anion Gap 10.0 5.0 - 15.0 mmol/L   Results for orders placed or performed in visit on 01/30/19   CBC Auto Differential    Specimen: Blood   Result Value Ref Range    WBC 6.93 3.20 - 9.80 10*3/mm3    RBC 4.29 3.77 - 5.16 10*6/mm3    Hemoglobin 14.2 12.0 - 15.5 g/dL    Hematocrit 39.2 35.0 - 45.0 %    MCV 91.4 80.0 - 98.0 fL    MCH 33.1 26.5 - 34.0 pg    MCHC 36.2 (H) 31.4 - 36.0 g/dL    RDW 11.9 11.5 - 14.5 %    RDW-SD 40.0 36.4 - 46.3 fl    MPV 9.4 8.0 - 12.0 fL    Platelets 357 150 - 450 10*3/mm3    Neutrophil % 47.9 37.0 - 80.0 %    Lymphocyte % 39.0 10.0 - 50.0 %    Monocyte % 8.7 0.0 - 12.0 %    Eosinophil % 3.2 0.0 - 7.0 %    Basophil % 0.6 0.0 - 2.0 %    Immature Grans % 0.6 (H) 0.0 - 0.5 %    Neutrophils, Absolute 3.33 2.00 - 8.60 10*3/mm3    Lymphocytes, Absolute 2.70 0.60 - 4.20 10*3/mm3    Monocytes, Absolute 0.60 0.00 - 0.90 10*3/mm3    Eosinophils, Absolute 0.22 0.00 - 0.70 10*3/mm3    Basophils, Absolute 0.04 0.00 - 0.20 10*3/mm3    Immature Grans, Absolute 0.04 (H) 0.00 - 0.02 10*3/mm3     *Note: Due to a large number of results and/or encounters for the requested time period, some results have not been displayed. A complete set of results can be found in Results Review.

## 2021-12-20 NOTE — PATIENT INSTRUCTIONS
MyPlate from USDA    MyPlate is an outline of a general healthy diet based on the 2010 Dietary Guidelines for Americans, from the U.S. Department of Agriculture (USDA). It sets guidelines for how much food you should eat from each food group based on your age, sex, and level of physical activity.  What are tips for following MyPlate?  To follow MyPlate recommendations:  · Eat a wide variety of fruits and vegetables, grains, and protein foods.  · Serve smaller portions and eat less food throughout the day.  · Limit portion sizes to avoid overeating.  · Enjoy your food.  · Get at least 150 minutes of exercise every week. This is about 30 minutes each day, 5 or more days per week.  It can be difficult to have every meal look like MyPlate. Think about MyPlate as eating guidelines for an entire day, rather than each individual meal.  Fruits and vegetables  · Make half of your plate fruits and vegetables.  · Eat many different colors of fruits and vegetables each day.  · For a 2,000 calorie daily food plan, eat:  ? 2½ cups of vegetables every day.  ? 2 cups of fruit every day.  · 1 cup is equal to:  ? 1 cup raw or cooked vegetables.  ? 1 cup raw fruit.  ? 1 medium-sized orange, apple, or banana.  ? 1 cup 100% fruit or vegetable juice.  ? 2 cups raw leafy greens, such as lettuce, spinach, or kale.  ? ½ cup dried fruit.  Grains  · One fourth of your plate should be grains.  · Make at least half of the grains you eat each day whole grains.  · For a 2,000 calorie daily food plan, eat 6 oz of grains every day.  · 1 oz is equal to:  ? 1 slice bread.  ? 1 cup cereal.  ? ½ cup cooked rice, cereal, or pasta.  Protein  · One fourth of your plate should be protein.  · Eat a wide variety of protein foods, including meat, poultry, fish, eggs, beans, nuts, and tofu.  · For a 2,000 calorie daily food plan, eat 5½ oz of protein every day.  · 1 oz is equal to:  ? 1 oz meat, poultry, or fish.  ? ¼ cup cooked beans.  ? 1 egg.  ? ½ oz nuts  or seeds.  ? 1 Tbsp peanut butter.  Dairy  · Drink fat-free or low-fat (1%) milk.  · Eat or drink dairy as a side to meals.  · For a 2,000 calorie daily food plan, eat or drink 3 cups of dairy every day.  · 1 cup is equal to:  ? 1 cup milk, yogurt, cottage cheese, or soy milk (soy beverage).  ? 2 oz processed cheese.  ? 1½ oz natural cheese.  Fats, oils, salt, and sugars  · Only small amounts of oils are recommended.  · Avoid foods that are high in calories and low in nutritional value (empty calories), like foods high in fat or added sugars.  · Choose foods that are low in salt (sodium). Choose foods that have less than 140 milligrams (mg) of sodium per serving.  · Drink water instead of sugary drinks. Drink enough water each day to keep your urine pale yellow.  Where to find support  · Work with your health care provider or a nutrition specialist (dietitian) to develop a customized eating plan that is right for you.  · Download an bing (mobile application) to help you track your daily food intake.  Where to find more information  · Go to ChooseMyPlate.gov for more information.  Summary  · MyPlate is a general guideline for healthy eating from the USDA. It is based on the 2010 Dietary Guidelines for Americans.  · In general, fruits and vegetables should take up ½ of your plate, grains should take up ¼ of your plate, and protein should take up ¼ of your plate.  This information is not intended to replace advice given to you by your health care provider. Make sure you discuss any questions you have with your health care provider.  Document Revised: 05/21/2020 Document Reviewed: 03/19/2018  Elsevier Patient Education © 2021 Elsevier Inc.

## 2022-01-18 RX ORDER — PANTOPRAZOLE SODIUM 40 MG/1
TABLET, DELAYED RELEASE ORAL
Qty: 30 TABLET | Refills: 0 | Status: SHIPPED | OUTPATIENT
Start: 2022-01-18 | End: 2022-02-21

## 2022-02-21 RX ORDER — PANTOPRAZOLE SODIUM 40 MG/1
TABLET, DELAYED RELEASE ORAL
Qty: 30 TABLET | Refills: 0 | Status: SHIPPED | OUTPATIENT
Start: 2022-02-21 | End: 2022-03-28

## 2022-02-21 NOTE — TELEPHONE ENCOUNTER
Please let patient know I need up to date CMP (lab) on file. I can order for her if she has not done within last year.  If her PCP has done blood work recently she can fax it here,

## 2022-02-23 ENCOUNTER — TELEPHONE (OUTPATIENT)
Dept: GASTROENTEROLOGY | Facility: CLINIC | Age: 27
End: 2022-02-23

## 2022-02-23 NOTE — TELEPHONE ENCOUNTER
Returned patient phone call, prescription required a PA which had been submitted and approved 2-. Patient was made aware as was the pharmacy.       ----- Message from Dario Wolfe sent at 2/23/2022 10:14 AM CST -----  Pt said that the [facility] pharm in [place] is saying that her ins will not cover the pantoprazole (PROTONIX) 40 MG EC tablet that was prescribed. She said that she thinks it the way it was written out. If you dont care can you call the pharm or the pt, her phone number is [phone]. Thanks!

## 2022-03-28 RX ORDER — PANTOPRAZOLE SODIUM 40 MG/1
TABLET, DELAYED RELEASE ORAL
Qty: 30 TABLET | Refills: 0 | Status: SHIPPED | OUTPATIENT
Start: 2022-03-28 | End: 2022-04-29 | Stop reason: SDUPTHER

## 2022-04-29 ENCOUNTER — LAB (OUTPATIENT)
Dept: LAB | Facility: HOSPITAL | Age: 27
End: 2022-04-29

## 2022-04-29 DIAGNOSIS — R10.13 EPIGASTRIC PAIN: ICD-10-CM

## 2022-04-29 DIAGNOSIS — K58.2 IRRITABLE BOWEL SYNDROME WITH BOTH CONSTIPATION AND DIARRHEA: Primary | ICD-10-CM

## 2022-04-29 LAB
ALBUMIN SERPL-MCNC: 4.4 G/DL (ref 3.5–5.2)
ALBUMIN/GLOB SERPL: 1.6 G/DL
ALP SERPL-CCNC: 119 U/L (ref 39–117)
ALT SERPL W P-5'-P-CCNC: 17 U/L (ref 1–33)
ANION GAP SERPL CALCULATED.3IONS-SCNC: 10.4 MMOL/L (ref 5–15)
AST SERPL-CCNC: 21 U/L (ref 1–32)
BILIRUB SERPL-MCNC: 0.5 MG/DL (ref 0–1.2)
BUN SERPL-MCNC: 8 MG/DL (ref 6–20)
BUN/CREAT SERPL: 13.3 (ref 7–25)
CALCIUM SPEC-SCNC: 9.4 MG/DL (ref 8.6–10.5)
CHLORIDE SERPL-SCNC: 104 MMOL/L (ref 98–107)
CO2 SERPL-SCNC: 24.6 MMOL/L (ref 22–29)
CREAT SERPL-MCNC: 0.6 MG/DL (ref 0.57–1)
EGFRCR SERPLBLD CKD-EPI 2021: 125.6 ML/MIN/1.73
GLOBULIN UR ELPH-MCNC: 2.8 GM/DL
GLUCOSE SERPL-MCNC: 81 MG/DL (ref 65–99)
POTASSIUM SERPL-SCNC: 4 MMOL/L (ref 3.5–5.2)
PROT SERPL-MCNC: 7.2 G/DL (ref 6–8.5)
SODIUM SERPL-SCNC: 139 MMOL/L (ref 136–145)

## 2022-04-29 PROCEDURE — 36415 COLL VENOUS BLD VENIPUNCTURE: CPT | Performed by: NURSE PRACTITIONER

## 2022-04-29 PROCEDURE — 80053 COMPREHEN METABOLIC PANEL: CPT | Performed by: NURSE PRACTITIONER

## 2022-05-03 RX ORDER — PANTOPRAZOLE SODIUM 40 MG/1
40 TABLET, DELAYED RELEASE ORAL DAILY
Qty: 30 TABLET | Refills: 0 | Status: SHIPPED | OUTPATIENT
Start: 2022-05-03

## 2022-10-24 NOTE — PROGRESS NOTES
Subjective   Arlette Zaidi is a 23 y.o. female. Requests refills on OCPs. Does not want pap smear today even though it is due.    LMP- 3 weeks ago  Last pap- 6/5/15 WNL      Gynecologic Exam   The patient's pertinent negatives include no genital itching, genital lesions, genital odor, genital rash, missed menses, pelvic pain, vaginal bleeding or vaginal discharge. Pertinent negatives include no abdominal pain, constipation, diarrhea, dysuria, headaches, nausea, rash, urgency or vomiting. She is sexually active. No, her partner does not have an STD. She uses oral contraceptives for contraception. Her menstrual history has been regular.       The following portions of the patient's history were reviewed and updated as appropriate: allergies, current medications, past medical history, past social history, past surgical history and problem list.    Review of Systems   Constitutional: Negative for activity change, appetite change, fatigue and unexpected weight change.   Respiratory: Negative for chest tightness and shortness of breath.    Cardiovascular: Negative for chest pain, palpitations and leg swelling.   Gastrointestinal: Negative for abdominal distention, abdominal pain, blood in stool, constipation, diarrhea, nausea and vomiting.   Endocrine: Negative for cold intolerance, heat intolerance, polydipsia, polyphagia and polyuria.   Genitourinary: Negative for difficulty urinating, dyspareunia, dysuria, genital sores, menstrual problem, missed menses, pelvic pain, urgency, vaginal bleeding, vaginal discharge and vaginal pain.   Musculoskeletal: Negative for gait problem and myalgias.   Skin: Negative for color change, pallor and rash.   Neurological: Negative for dizziness, weakness, light-headedness and headaches.   Hematological: Negative for adenopathy.   Psychiatric/Behavioral: Negative for agitation, confusion, dysphoric mood, self-injury and suicidal ideas. The patient is not nervous/anxious.         Objective   Physical Exam   Constitutional: She is oriented to person, place, and time. She appears well-developed and well-nourished. No distress.   Neck: No thyromegaly present.   Cardiovascular: Normal rate, regular rhythm and normal heart sounds.    Pulmonary/Chest: Effort normal and breath sounds normal.   Neurological: She is alert and oriented to person, place, and time.   Skin: Skin is warm and dry. Capillary refill takes less than 2 seconds. She is not diaphoretic.   Psychiatric: She has a normal mood and affect. Her behavior is normal.   Nursing note and vitals reviewed.      Assessment/Plan   Arlette was seen today for gynecologic exam.    Diagnoses and all orders for this visit:    Surveillance of contraceptive pill  -     norethindrone-ethinyl estradiol-ferrous fumarate (BLISOVI 24 FE) 1-20 MG-MCG(24) per tablet; Take 1 tablet by mouth Daily.     F/U in 1 year for refills and pap smear.            Previously Declined (within the last year)

## 2023-03-08 NOTE — ANESTHESIA POSTPROCEDURE EVALUATION
-- DO NOT REPLY / DO NOT REPLY ALL --  -- Message is from [facility] [employer] ([facility]) --    ONLY TO BE USED WITHIN A REFILL MEDICATION ENCOUNTER    Med Refill  Is the patient currently having any symptoms?: No/Non-Emergent symptoms    Name of medication requested: See pended med    Has patient contacted the pharmacy? Yes    Is this the first request for the medication in the last 48 hours?: Yes    Patient is requesting a medication refill - medication is on active medication list    Patient is currently OUT of the requested medication - sent as HIGH priority      Full name of the provider who ordered the medication: Dr. Ubaldo Teixeira    [facility] site name / Account # for provider: [place]    Preferred Pharmacy: Pharmacy  [facility]arm[facility] #[id number] - [place], [place] - [address]    Patient confirmed the above pharmacy as correct?  Yes          Alternative phone number: [phone]    Can a detailed message be left?: Yes    Patient is completely out of medication: Verify if patient is currently experiencing symptoms. If patient is symptomatic, proceed with front end triage instead of medication refill. If patient is not symptomatic but is completely out of medication, sonido as High priority when routing. Inform patient: “Please call back with any questions or concerns and if your condition becomes life threatening, you should seek immediate medical assistance by calling 911 or going to the Emergency Department for evaluation.”    Inform all patients: \"If the clinical team needs to contact you regarding this refill, please be aware the return phone call may come from an unidentified or out of state phone number and your refill request will be addressed as soon as the clinical team reviews your message.\"   Patient: Arlette Zaidi    Procedure Summary     Date:  11/15/18 Room / Location:  [facility] ENDOSCOPY 1 / [facility] ENDOSCOPY    Anesthesia Start:  1421 Anesthesia Stop:  1520    Procedures:       ESOPHAGOGASTRODUODENOSCOPY (N/A )      COLONOSCOPY (N/A ) Diagnosis:       Nausea      Weight loss, abnormal      Diarrhea, unspecified type      (Nausea [R11.0])      (Weight loss, abnormal [R63.4])      (Diarrhea, unspecified type [R19.7])    Surgeon:  Jaiyeola, Aderemi, MD Provider:  Elia Berrios CRNA    Anesthesia Type:  MAC ASA Status:  2          Anesthesia Type: MAC  Last vitals  BP   115/75 (11/15/18 1300)   Temp   98.3 °F (36.8 °C) (11/15/18 1300)   Pulse   93 (11/15/18 1300)   Resp   18 (11/15/18 1300)     SpO2         Post Anesthesia Care and Evaluation    Patient location during evaluation: PHASE II  Level of consciousness: responsive to light touch  Pain score: 0  Pain management: adequate  Airway patency: patent  Anesthetic complications: No anesthetic complications  PONV Status: none  Cardiovascular status: acceptable and hemodynamically stable  Respiratory status: acceptable and spontaneous ventilation  Hydration status: acceptable

## 2023-05-11 ENCOUNTER — OFFICE VISIT (OUTPATIENT)
Dept: GASTROENTEROLOGY | Facility: CLINIC | Age: 28
End: 2023-05-11
Payer: COMMERCIAL

## 2023-05-11 VITALS
HEART RATE: 70 BPM | SYSTOLIC BLOOD PRESSURE: 104 MMHG | DIASTOLIC BLOOD PRESSURE: 62 MMHG | BODY MASS INDEX: 21.2 KG/M2 | WEIGHT: 108 LBS | HEIGHT: 60 IN

## 2023-05-11 DIAGNOSIS — R10.13 EPIGASTRIC PAIN: ICD-10-CM

## 2023-05-11 DIAGNOSIS — K21.00 GASTROESOPHAGEAL REFLUX DISEASE WITH ESOPHAGITIS WITHOUT HEMORRHAGE: Primary | ICD-10-CM

## 2023-05-11 PROCEDURE — 99213 OFFICE O/P EST LOW 20 MIN: CPT | Performed by: NURSE PRACTITIONER

## 2023-05-11 RX ORDER — DEXTROSE AND SODIUM CHLORIDE 5; .45 G/100ML; G/100ML
30 INJECTION, SOLUTION INTRAVENOUS CONTINUOUS PRN
OUTPATIENT
Start: 2023-05-11

## 2023-05-11 RX ORDER — FLUTICASONE PROPIONATE 50 MCG
1 SPRAY, SUSPENSION (ML) NASAL DAILY
COMMUNITY

## 2023-05-11 RX ORDER — ACETAMINOPHEN AND CODEINE PHOSPHATE 120; 12 MG/5ML; MG/5ML
0.35 SOLUTION ORAL DAILY
Qty: 28 TABLET | Refills: 12 | COMMUNITY
Start: 2023-04-19 | End: 2024-04-19

## 2023-05-11 RX ORDER — SODIUM CHLORIDE 9 MG/ML
40 INJECTION, SOLUTION INTRAVENOUS AS NEEDED
OUTPATIENT
Start: 2023-05-11

## 2023-05-11 NOTE — H&P (VIEW-ONLY)
Chief Complaint   Patient presents with   • Heartburn   • Abdominal Pain       Subjective    Arlette Mendoza is a 28 y.o. female. she is here today for follow-up.                                                                  Assessment & Plan                                     1. Gastroesophageal reflux disease with esophagitis without hemorrhage    2. Epigastric pain      Plan; schedule patient for EGD due to worsening symptoms rule out Marquez's esophagus or peptic ulcer due to chronic gastritis..  Discussed restarting Protonix however patient is currently lactating and will discuss first with lactation consultant contact office as to whether they prefer PPI or H2 blocker I will send in prescription at that time.    Follow-up: Return in about 4 weeks (around 6/8/2023) for Recheck, After test.     HPI  28-year-old female presents for recheck regarding recurrent GERD and abdominal pain.  She was last seen 12/20/2021 and since that time got  and was pregnant now has a 3-month-old.  Reports during pregnancy reflux was not an issue however over the last 2 to 3 months she has had significant increase in discomfort epigastric pain and regurgitation/belching acidic material.  Previous EGD was completed in 2018 per Dr. Carney consistent with candidal esophagitis and chronic gastritis.  She also has history of irritable bowel syndrome but has not had any flare of symptoms in some time does not take dicyclomine anymore.  Reflux was previously well controlled Protonix without discontinued while she was pregnant.    Review of Systems  Review of Systems   Constitutional: Negative for activity change, appetite change, chills, diaphoresis, fatigue, fever and unexpected weight change.   HENT: Negative for sore throat and trouble swallowing.    Respiratory: Negative for shortness of breath.   "  Gastrointestinal: Positive for abdominal pain (pain/discomfort). Negative for abdominal distention, anal bleeding, blood in stool, constipation, diarrhea, nausea, rectal pain and vomiting.   Musculoskeletal: Negative for arthralgias.   Skin: Negative for pallor.   Neurological: Negative for light-headedness.       /62 (BP Location: Left arm)   Pulse 70   Ht 152.4 cm (60\")   Wt 49 kg (108 lb)   BMI 21.09 kg/m²     Objective      Physical Exam  Constitutional:       General: She is not in acute distress.     Appearance: Normal appearance. She is normal weight. She is not ill-appearing or toxic-appearing.   HENT:      Head: Normocephalic and atraumatic.   Pulmonary:      Effort: Pulmonary effort is normal.   Abdominal:      General: Abdomen is flat. Bowel sounds are normal. There is no distension.      Palpations: Abdomen is soft. There is no mass.      Tenderness: There is no abdominal tenderness.   Neurological:      Mental Status: She is alert.               The following portions of the patient's history were reviewed and updated as appropriate:   Past Medical History:   Diagnosis Date   • Acute laryngitis    • Allergic rhinitis due to pollen    • Asthma     Asthmatic tendency      • Cough    • Cyst of ovary    • Encounter for initial prescription of contraceptive pills    • Encounter for surveillance of contraceptive pills    • Gastroesophageal reflux disease with esophagitis without hemorrhage 5/11/2023   • Health maintenance examination    • Nonspecific reaction to tuberculin skin test without active tuberculosis     Negatiave   • Underimmunization status    • Upper respiratory infection    • Vaccination required    • Visit for gynecologic examination    • Well child visit      Past Surgical History:   Procedure Laterality Date   • CHOLECYSTECTOMY     • COLONOSCOPY N/A 11/15/2018    Procedure: COLONOSCOPY;  Surgeon: Jaiyeola, Aderemi, MD;  Location: [facility] ENDOSCOPY;  Service: Gastroenterology   • " ENDOSCOPY N/A 11/15/2018    Procedure: ESOPHAGOGASTRODUODENOSCOPY;  Surgeon: Jaiyeola, Aderemi, MD;  Location: [facility] ENDOSCOPY;  Service: Gastroenterology   • INJECTION OF MEDICATION  04/17/2015    Celestone (betamethasone) (3)    • INJECTION OF MEDICATION  01/18/2016    Depo Medrol (Methylprednisone) (9)      Family History   Problem Relation Age of Onset   • Ulcerative colitis Maternal Uncle    • Ulcerative colitis Maternal Grandmother      OB History    No obstetric history on file.       Allergies   Allergen Reactions   • Clindamycin Nausea And Vomiting   • Azithromycin Nausea And Vomiting   • Doxycycline Rash   • Penicillins GI Intolerance and Nausea Only     Social History     Socioeconomic History   • Marital status: Single   Tobacco Use   • Smoking status: Never   • Smokeless tobacco: Never   Substance and Sexual Activity   • Alcohol use: No   • Drug use: Defer   • Sexual activity: Defer     Current Medications:  Prior to Admission medications    Medication Sig Start Date End Date Taking? Authorizing Provider   albuterol sulfate  (90 Base) MCG/ACT inhaler INL 2 PFS ITL Q 4 H FOR UP TO 14 DAYS PRF WHZ OR SOB 11/9/19  Yes ProviderMagali MD   fluticasone (FLONASE) 50 MCG/ACT nasal spray 1 spray into the nostril(s) as directed by provider Daily.   Yes ProviderMagali MD   MULTIPLE VITAMINS-MINERALS PO Take  by mouth.   Yes ProviderMagali MD   norethindrone (MICRONOR) 0.35 MG tablet Take 1 tablet by mouth Daily. 4/19/23 4/19/24 Yes ProviderMagali MD   doxepin (SINEquan) 50 MG capsule  12/18/21 5/11/23  ProviderMagali MD   pantoprazole (PROTONIX) 40 MG EC tablet Take 1 tablet by mouth Daily. 5/3/22 5/11/23  Fatou Lin APRN     Orders placed during this encounter include:  Orders Placed This Encounter   Procedures   • Obtain Informed Consent     Standing Status:   Future     Order Specific Question:   Informed Consent Given For     Answer:   ESOPHAGOGASTRODUODENOSCOPY  possible dilation     ESOPHAGOGASTRODUODENOSCOPY possible dilation (N/A)  No orders of the defined types were placed in this encounter.        Review and/or summary of lab tests, radiology, procedures, medications. Review and summary of old records and obtaining of history. The risks and benefits of my recommendations, as well as other treatment options were discussed . Any questions/concerned were answered. Patient voiced understanding and agreement.          This document has been electronically signed by DEIDRA Castro on May 15, 2023 08:00 CDT                                               Results for orders placed or performed in visit on 04/29/22   Comprehensive Metabolic Panel    Specimen: Blood   Result Value Ref Range    Glucose 81 65 - 99 mg/dL    BUN 8 6 - 20 mg/dL    Creatinine 0.60 0.57 - 1.00 mg/dL    Sodium 139 136 - 145 mmol/L    Potassium 4.0 3.5 - 5.2 mmol/L    Chloride 104 98 - 107 mmol/L    CO2 24.6 22.0 - 29.0 mmol/L    Calcium 9.4 8.6 - 10.5 mg/dL    Total Protein 7.2 6.0 - 8.5 g/dL    Albumin 4.40 3.50 - 5.20 g/dL    ALT (SGPT) 17 1 - 33 U/L    AST (SGOT) 21 1 - 32 U/L    Alkaline Phosphatase 119 (H) 39 - 117 U/L    Total Bilirubin 0.5 0.0 - 1.2 mg/dL    Globulin 2.8 gm/dL    A/G Ratio 1.6 g/dL    BUN/Creatinine Ratio 13.3 7.0 - 25.0    Anion Gap 10.4 5.0 - 15.0 mmol/L    eGFR 125.6 >60.0 mL/min/1.73   Results for orders placed or performed in visit on 11/05/19   CBC Auto Differential    Specimen: Blood   Result Value Ref Range    WBC 10.94 (H) 3.40 - 10.80 10*3/mm3    RBC 4.22 3.77 - 5.28 10*6/mm3    Hemoglobin 13.6 12.0 - 15.9 g/dL    Hematocrit 39.4 34.0 - 46.6 %    MCV 93.4 79.0 - 97.0 fL    MCH 32.2 26.6 - 33.0 pg    MCHC 34.5 31.5 - 35.7 g/dL    RDW 11.8 (L) 12.3 - 15.4 %    RDW-SD 40.6 37.0 - 54.0 fl    MPV 9.9 6.0 - 12.0 fL    Platelets 377 140 - 450 10*3/mm3    Neutrophil % 71.2 42.7 - 76.0 %    Lymphocyte % 18.0 (L) 19.6 - 45.3 %    Monocyte % 9.8 5.0 - 12.0 %    Eosinophil % 0.1  (L) 0.3 - 6.2 %    Basophil % 0.4 0.0 - 1.5 %    Immature Grans % 0.5 0.0 - 0.5 %    Neutrophils, Absolute 7.79 (H) 1.70 - 7.00 10*3/mm3    Lymphocytes, Absolute 1.97 0.70 - 3.10 10*3/mm3    Monocytes, Absolute 1.07 (H) 0.10 - 0.90 10*3/mm3    Eosinophils, Absolute 0.01 0.00 - 0.40 10*3/mm3    Basophils, Absolute 0.04 0.00 - 0.20 10*3/mm3    Immature Grans, Absolute 0.06 (H) 0.00 - 0.05 10*3/mm3    nRBC 0.0 0.0 - 0.2 /100 WBC   Comprehensive metabolic panel    Specimen: Blood   Result Value Ref Range    Glucose 77 65 - 99 mg/dL    BUN 7 6 - 20 mg/dL    Creatinine 0.63 0.57 - 1.00 mg/dL    Sodium 141 136 - 145 mmol/L    Potassium 3.7 3.5 - 5.2 mmol/L    Chloride 102 98 - 107 mmol/L    CO2 29.5 (H) 22.0 - 29.0 mmol/L    Calcium 9.0 8.6 - 10.5 mg/dL    Total Protein 7.6 6.0 - 8.5 g/dL    Albumin 4.20 3.50 - 5.20 g/dL    ALT (SGPT) 10 1 - 33 U/L    AST (SGOT) 15 1 - 32 U/L    Alkaline Phosphatase 73 39 - 117 U/L    Total Bilirubin 0.4 0.2 - 1.2 mg/dL    eGFR Non African Amer 115 >60 mL/min/1.73    Globulin 3.4 gm/dL    A/G Ratio 1.2 g/dL    BUN/Creatinine Ratio 11.1 7.0 - 25.0    Anion Gap 9.5 5.0 - 15.0 mmol/L   Results for orders placed or performed in visit on 03/01/19   CBC Auto Differential    Specimen: Blood   Result Value Ref Range    WBC 5.72 3.40 - 10.80 10*3/mm3    RBC 4.07 3.77 - 5.28 10*6/mm3    Hemoglobin 13.5 12.0 - 15.9 g/dL    Hematocrit 37.5 34.0 - 46.6 %    MCV 92.1 79.0 - 97.0 fL    MCH 33.2 (H) 26.6 - 33.0 pg    MCHC 36.0 (H) 31.5 - 35.7 g/dL    RDW 11.8 (L) 12.3 - 15.4 %    RDW-SD 39.7 37.0 - 54.0 fl    MPV 9.7 6.0 - 12.0 fL    Platelets 385 140 - 450 10*3/mm3    Neutrophil % 53.7 42.7 - 76.0 %    Lymphocyte % 35.5 19.6 - 45.3 %    Monocyte % 7.0 5.0 - 12.0 %    Eosinophil % 2.4 0.3 - 6.2 %    Basophil % 0.9 0.0 - 1.5 %    Immature Grans % 0.5 0.0 - 0.5 %    Neutrophils, Absolute 3.07 1.40 - 7.00 10*3/mm3    Lymphocytes, Absolute 2.03 0.70 - 3.10 10*3/mm3    Monocytes, Absolute 0.40 0.10 - 0.90  10*3/mm3    Eosinophils, Absolute 0.14 0.00 - 0.40 10*3/mm3    Basophils, Absolute 0.05 0.00 - 0.20 10*3/mm3    Immature Grans, Absolute 0.03 0.00 - 0.05 10*3/mm3    nRBC 0.0 0.0 - 0.0 /100 WBC   Comprehensive metabolic panel    Specimen: Blood   Result Value Ref Range    Glucose 81 60 - 100 mg/dL    BUN 8 7 - 21 mg/dL    Creatinine 0.57 0.50 - 1.00 mg/dL    Sodium 138 137 - 145 mmol/L    Potassium 4.1 3.5 - 5.1 mmol/L    Chloride 100 95 - 110 mmol/L    CO2 28.0 22.0 - 31.0 mmol/L    Calcium 9.3 8.4 - 10.2 mg/dL    Total Protein 7.6 6.3 - 8.6 g/dL    Albumin 4.30 3.40 - 4.80 g/dL    ALT (SGPT) 29 9 - 52 U/L    AST (SGOT) 55 (H) 14 - 36 U/L    Alkaline Phosphatase 102 38 - 126 U/L    Total Bilirubin 0.9 0.2 - 1.3 mg/dL    eGFR Non  Amer 129 71 - 165 mL/min/1.73    Globulin 3.3 2.3 - 3.5 gm/dL    A/G Ratio 1.3 1.1 - 1.8 g/dL    BUN/Creatinine Ratio 14.0 7.0 - 25.0    Anion Gap 10.0 5.0 - 15.0 mmol/L     *Note: Due to a large number of results and/or encounters for the requested time period, some results have not been displayed. A complete set of results can be found in Results Review.

## 2023-05-11 NOTE — PROGRESS NOTES
Chief Complaint   Patient presents with   • Heartburn   • Abdominal Pain       Subjective    Arlette Mendoza is a 28 y.o. female. she is here today for follow-up.                                                                  Assessment & Plan                                     1. Gastroesophageal reflux disease with esophagitis without hemorrhage    2. Epigastric pain      Plan; schedule patient for EGD due to worsening symptoms rule out Marquez's esophagus or peptic ulcer due to chronic gastritis..  Discussed restarting Protonix however patient is currently lactating and will discuss first with lactation consultant contact office as to whether they prefer PPI or H2 blocker I will send in prescription at that time.    Follow-up: Return in about 4 weeks (around 6/8/2023) for Recheck, After test.     HPI  28-year-old female presents for recheck regarding recurrent GERD and abdominal pain.  She was last seen 12/20/2021 and since that time got  and was pregnant now has a 3-month-old.  Reports during pregnancy reflux was not an issue however over the last 2 to 3 months she has had significant increase in discomfort epigastric pain and regurgitation/belching acidic material.  Previous EGD was completed in 2018 per Dr. Carney consistent with candidal esophagitis and chronic gastritis.  She also has history of irritable bowel syndrome but has not had any flare of symptoms in some time does not take dicyclomine anymore.  Reflux was previously well controlled Protonix without discontinued while she was pregnant.    Review of Systems  Review of Systems   Constitutional: Negative for activity change, appetite change, chills, diaphoresis, fatigue, fever and unexpected weight change.   HENT: Negative for sore throat and trouble swallowing.    Respiratory: Negative for shortness of breath.   "  Gastrointestinal: Positive for abdominal pain (pain/discomfort). Negative for abdominal distention, anal bleeding, blood in stool, constipation, diarrhea, nausea, rectal pain and vomiting.   Musculoskeletal: Negative for arthralgias.   Skin: Negative for pallor.   Neurological: Negative for light-headedness.       /62 (BP Location: Left arm)   Pulse 70   Ht 152.4 cm (60\")   Wt 49 kg (108 lb)   BMI 21.09 kg/m²     Objective      Physical Exam  Constitutional:       General: She is not in acute distress.     Appearance: Normal appearance. She is normal weight. She is not ill-appearing or toxic-appearing.   HENT:      Head: Normocephalic and atraumatic.   Pulmonary:      Effort: Pulmonary effort is normal.   Abdominal:      General: Abdomen is flat. Bowel sounds are normal. There is no distension.      Palpations: Abdomen is soft. There is no mass.      Tenderness: There is no abdominal tenderness.   Neurological:      Mental Status: She is alert.               The following portions of the patient's history were reviewed and updated as appropriate:   Past Medical History:   Diagnosis Date   • Acute laryngitis    • Allergic rhinitis due to pollen    • Asthma     Asthmatic tendency      • Cough    • Cyst of ovary    • Encounter for initial prescription of contraceptive pills    • Encounter for surveillance of contraceptive pills    • Gastroesophageal reflux disease with esophagitis without hemorrhage 5/11/2023   • Health maintenance examination    • Nonspecific reaction to tuberculin skin test without active tuberculosis     Negatiave   • Underimmunization status    • Upper respiratory infection    • Vaccination required    • Visit for gynecologic examination    • Well child visit      Past Surgical History:   Procedure Laterality Date   • CHOLECYSTECTOMY     • COLONOSCOPY N/A 11/15/2018    Procedure: COLONOSCOPY;  Surgeon: Jaiyeola, Aderemi, MD;  Location: [facility] ENDOSCOPY;  Service: Gastroenterology   • " ENDOSCOPY N/A 11/15/2018    Procedure: ESOPHAGOGASTRODUODENOSCOPY;  Surgeon: Jaiyeola, Aderemi, MD;  Location: [facility] ENDOSCOPY;  Service: Gastroenterology   • INJECTION OF MEDICATION  04/17/2015    Celestone (betamethasone) (3)    • INJECTION OF MEDICATION  01/18/2016    Depo Medrol (Methylprednisone) (9)      Family History   Problem Relation Age of Onset   • Ulcerative colitis Maternal Uncle    • Ulcerative colitis Maternal Grandmother      OB History    No obstetric history on file.       Allergies   Allergen Reactions   • Clindamycin Nausea And Vomiting   • Azithromycin Nausea And Vomiting   • Doxycycline Rash   • Penicillins GI Intolerance and Nausea Only     Social History     Socioeconomic History   • Marital status: Single   Tobacco Use   • Smoking status: Never   • Smokeless tobacco: Never   Substance and Sexual Activity   • Alcohol use: No   • Drug use: Defer   • Sexual activity: Defer     Current Medications:  Prior to Admission medications    Medication Sig Start Date End Date Taking? Authorizing Provider   albuterol sulfate  (90 Base) MCG/ACT inhaler INL 2 PFS ITL Q 4 H FOR UP TO 14 DAYS PRF WHZ OR SOB 11/9/19  Yes ProviderMagali MD   fluticasone (FLONASE) 50 MCG/ACT nasal spray 1 spray into the nostril(s) as directed by provider Daily.   Yes ProviderMagali MD   MULTIPLE VITAMINS-MINERALS PO Take  by mouth.   Yes ProviderMagali MD   norethindrone (MICRONOR) 0.35 MG tablet Take 1 tablet by mouth Daily. 4/19/23 4/19/24 Yes ProviderMagali MD   doxepin (SINEquan) 50 MG capsule  12/18/21 5/11/23  ProviderMagali MD   pantoprazole (PROTONIX) 40 MG EC tablet Take 1 tablet by mouth Daily. 5/3/22 5/11/23  Fatou Lin APRN     Orders placed during this encounter include:  Orders Placed This Encounter   Procedures   • Obtain Informed Consent     Standing Status:   Future     Order Specific Question:   Informed Consent Given For     Answer:   ESOPHAGOGASTRODUODENOSCOPY  possible dilation     ESOPHAGOGASTRODUODENOSCOPY possible dilation (N/A)  No orders of the defined types were placed in this encounter.        Review and/or summary of lab tests, radiology, procedures, medications. Review and summary of old records and obtaining of history. The risks and benefits of my recommendations, as well as other treatment options were discussed . Any questions/concerned were answered. Patient voiced understanding and agreement.          This document has been electronically signed by DEIDRA Castro on May 15, 2023 08:00 CDT                                               Results for orders placed or performed in visit on 04/29/22   Comprehensive Metabolic Panel    Specimen: Blood   Result Value Ref Range    Glucose 81 65 - 99 mg/dL    BUN 8 6 - 20 mg/dL    Creatinine 0.60 0.57 - 1.00 mg/dL    Sodium 139 136 - 145 mmol/L    Potassium 4.0 3.5 - 5.2 mmol/L    Chloride 104 98 - 107 mmol/L    CO2 24.6 22.0 - 29.0 mmol/L    Calcium 9.4 8.6 - 10.5 mg/dL    Total Protein 7.2 6.0 - 8.5 g/dL    Albumin 4.40 3.50 - 5.20 g/dL    ALT (SGPT) 17 1 - 33 U/L    AST (SGOT) 21 1 - 32 U/L    Alkaline Phosphatase 119 (H) 39 - 117 U/L    Total Bilirubin 0.5 0.0 - 1.2 mg/dL    Globulin 2.8 gm/dL    A/G Ratio 1.6 g/dL    BUN/Creatinine Ratio 13.3 7.0 - 25.0    Anion Gap 10.4 5.0 - 15.0 mmol/L    eGFR 125.6 >60.0 mL/min/1.73   Results for orders placed or performed in visit on 11/05/19   CBC Auto Differential    Specimen: Blood   Result Value Ref Range    WBC 10.94 (H) 3.40 - 10.80 10*3/mm3    RBC 4.22 3.77 - 5.28 10*6/mm3    Hemoglobin 13.6 12.0 - 15.9 g/dL    Hematocrit 39.4 34.0 - 46.6 %    MCV 93.4 79.0 - 97.0 fL    MCH 32.2 26.6 - 33.0 pg    MCHC 34.5 31.5 - 35.7 g/dL    RDW 11.8 (L) 12.3 - 15.4 %    RDW-SD 40.6 37.0 - 54.0 fl    MPV 9.9 6.0 - 12.0 fL    Platelets 377 140 - 450 10*3/mm3    Neutrophil % 71.2 42.7 - 76.0 %    Lymphocyte % 18.0 (L) 19.6 - 45.3 %    Monocyte % 9.8 5.0 - 12.0 %    Eosinophil % 0.1  (L) 0.3 - 6.2 %    Basophil % 0.4 0.0 - 1.5 %    Immature Grans % 0.5 0.0 - 0.5 %    Neutrophils, Absolute 7.79 (H) 1.70 - 7.00 10*3/mm3    Lymphocytes, Absolute 1.97 0.70 - 3.10 10*3/mm3    Monocytes, Absolute 1.07 (H) 0.10 - 0.90 10*3/mm3    Eosinophils, Absolute 0.01 0.00 - 0.40 10*3/mm3    Basophils, Absolute 0.04 0.00 - 0.20 10*3/mm3    Immature Grans, Absolute 0.06 (H) 0.00 - 0.05 10*3/mm3    nRBC 0.0 0.0 - 0.2 /100 WBC   Comprehensive metabolic panel    Specimen: Blood   Result Value Ref Range    Glucose 77 65 - 99 mg/dL    BUN 7 6 - 20 mg/dL    Creatinine 0.63 0.57 - 1.00 mg/dL    Sodium 141 136 - 145 mmol/L    Potassium 3.7 3.5 - 5.2 mmol/L    Chloride 102 98 - 107 mmol/L    CO2 29.5 (H) 22.0 - 29.0 mmol/L    Calcium 9.0 8.6 - 10.5 mg/dL    Total Protein 7.6 6.0 - 8.5 g/dL    Albumin 4.20 3.50 - 5.20 g/dL    ALT (SGPT) 10 1 - 33 U/L    AST (SGOT) 15 1 - 32 U/L    Alkaline Phosphatase 73 39 - 117 U/L    Total Bilirubin 0.4 0.2 - 1.2 mg/dL    eGFR Non African Amer 115 >60 mL/min/1.73    Globulin 3.4 gm/dL    A/G Ratio 1.2 g/dL    BUN/Creatinine Ratio 11.1 7.0 - 25.0    Anion Gap 9.5 5.0 - 15.0 mmol/L   Results for orders placed or performed in visit on 03/01/19   CBC Auto Differential    Specimen: Blood   Result Value Ref Range    WBC 5.72 3.40 - 10.80 10*3/mm3    RBC 4.07 3.77 - 5.28 10*6/mm3    Hemoglobin 13.5 12.0 - 15.9 g/dL    Hematocrit 37.5 34.0 - 46.6 %    MCV 92.1 79.0 - 97.0 fL    MCH 33.2 (H) 26.6 - 33.0 pg    MCHC 36.0 (H) 31.5 - 35.7 g/dL    RDW 11.8 (L) 12.3 - 15.4 %    RDW-SD 39.7 37.0 - 54.0 fl    MPV 9.7 6.0 - 12.0 fL    Platelets 385 140 - 450 10*3/mm3    Neutrophil % 53.7 42.7 - 76.0 %    Lymphocyte % 35.5 19.6 - 45.3 %    Monocyte % 7.0 5.0 - 12.0 %    Eosinophil % 2.4 0.3 - 6.2 %    Basophil % 0.9 0.0 - 1.5 %    Immature Grans % 0.5 0.0 - 0.5 %    Neutrophils, Absolute 3.07 1.40 - 7.00 10*3/mm3    Lymphocytes, Absolute 2.03 0.70 - 3.10 10*3/mm3    Monocytes, Absolute 0.40 0.10 - 0.90  10*3/mm3    Eosinophils, Absolute 0.14 0.00 - 0.40 10*3/mm3    Basophils, Absolute 0.05 0.00 - 0.20 10*3/mm3    Immature Grans, Absolute 0.03 0.00 - 0.05 10*3/mm3    nRBC 0.0 0.0 - 0.0 /100 WBC   Comprehensive metabolic panel    Specimen: Blood   Result Value Ref Range    Glucose 81 60 - 100 mg/dL    BUN 8 7 - 21 mg/dL    Creatinine 0.57 0.50 - 1.00 mg/dL    Sodium 138 137 - 145 mmol/L    Potassium 4.1 3.5 - 5.1 mmol/L    Chloride 100 95 - 110 mmol/L    CO2 28.0 22.0 - 31.0 mmol/L    Calcium 9.3 8.4 - 10.2 mg/dL    Total Protein 7.6 6.3 - 8.6 g/dL    Albumin 4.30 3.40 - 4.80 g/dL    ALT (SGPT) 29 9 - 52 U/L    AST (SGOT) 55 (H) 14 - 36 U/L    Alkaline Phosphatase 102 38 - 126 U/L    Total Bilirubin 0.9 0.2 - 1.3 mg/dL    eGFR Non  Amer 129 71 - 165 mL/min/1.73    Globulin 3.3 2.3 - 3.5 gm/dL    A/G Ratio 1.3 1.1 - 1.8 g/dL    BUN/Creatinine Ratio 14.0 7.0 - 25.0    Anion Gap 10.0 5.0 - 15.0 mmol/L     *Note: Due to a large number of results and/or encounters for the requested time period, some results have not been displayed. A complete set of results can be found in Results Review.

## 2023-05-30 ENCOUNTER — HOSPITAL ENCOUNTER (OUTPATIENT)
Facility: HOSPITAL | Age: 28
Setting detail: HOSPITAL OUTPATIENT SURGERY
Discharge: HOME | End: 2023-05-30
Attending: INTERNAL MEDICINE | Admitting: INTERNAL MEDICINE
Payer: COMMERCIAL

## 2023-05-30 ENCOUNTER — ANESTHESIA EVENT (OUTPATIENT)
Dept: GASTROENTEROLOGY | Facility: HOSPITAL | Age: 28
End: 2023-05-30
Payer: COMMERCIAL

## 2023-05-30 ENCOUNTER — ANESTHESIA (OUTPATIENT)
Dept: GASTROENTEROLOGY | Facility: HOSPITAL | Age: 28
End: 2023-05-30
Payer: COMMERCIAL

## 2023-05-30 VITALS
BODY MASS INDEX: 20.81 KG/M2 | RESPIRATION RATE: 16 BRPM | DIASTOLIC BLOOD PRESSURE: 59 MMHG | HEIGHT: 60 IN | SYSTOLIC BLOOD PRESSURE: 103 MMHG | WEIGHT: 106 LBS | OXYGEN SATURATION: 99 % | HEART RATE: 74 BPM | TEMPERATURE: 98 F

## 2023-05-30 DIAGNOSIS — K21.00 GASTROESOPHAGEAL REFLUX DISEASE WITH ESOPHAGITIS WITHOUT HEMORRHAGE: ICD-10-CM

## 2023-05-30 DIAGNOSIS — R10.13 EPIGASTRIC PAIN: ICD-10-CM

## 2023-05-30 LAB — B-HCG UR QL: NEGATIVE

## 2023-05-30 PROCEDURE — 43239 EGD BIOPSY SINGLE/MULTIPLE: CPT | Performed by: INTERNAL MEDICINE

## 2023-05-30 PROCEDURE — 81025 URINE PREGNANCY TEST: CPT | Performed by: INTERNAL MEDICINE

## 2023-05-30 PROCEDURE — 25010000002 PROPOFOL 10 MG/ML EMULSION: Performed by: NURSE ANESTHETIST, CERTIFIED REGISTERED

## 2023-05-30 RX ORDER — DEXTROSE AND SODIUM CHLORIDE 5; .45 G/100ML; G/100ML
30 INJECTION, SOLUTION INTRAVENOUS CONTINUOUS PRN
Status: DISCONTINUED | OUTPATIENT
Start: 2023-05-30 | End: 2023-05-30 | Stop reason: HOSPADM

## 2023-05-30 RX ORDER — SODIUM CHLORIDE 9 MG/ML
40 INJECTION, SOLUTION INTRAVENOUS AS NEEDED
Status: DISCONTINUED | OUTPATIENT
Start: 2023-05-30 | End: 2023-05-30 | Stop reason: HOSPADM

## 2023-05-30 RX ORDER — PROPOFOL 10 MG/ML
VIAL (ML) INTRAVENOUS AS NEEDED
Status: DISCONTINUED | OUTPATIENT
Start: 2023-05-30 | End: 2023-05-30 | Stop reason: SURG

## 2023-05-30 RX ADMIN — DEXTROSE AND SODIUM CHLORIDE 30 ML/HR: 5; 450 INJECTION, SOLUTION INTRAVENOUS at 15:51

## 2023-05-30 RX ADMIN — PROPOFOL 150 MG: 10 INJECTION, EMULSION INTRAVENOUS at 16:24

## 2023-05-30 RX ADMIN — PROPOFOL 50 MG: 10 INJECTION, EMULSION INTRAVENOUS at 16:26

## 2023-05-30 NOTE — ANESTHESIA PREPROCEDURE EVALUATION
Anesthesia Evaluation     Patient summary reviewed   NPO Solid Status: > 8 hours  NPO Liquid Status: > 2 hours           Airway   Mallampati: I  TM distance: >3 FB  Dental - normal exam     Pulmonary - normal exam   (+) asthma,  Cardiovascular - negative cardio ROS and normal exam  Exercise tolerance: excellent (>7 METS)        Neuro/Psych- negative ROS  GI/Hepatic/Renal/Endo - negative ROS     Musculoskeletal (-) negative ROS    Abdominal  - normal exam   Substance History - negative use     OB/GYN negative ob/gyn ROS   (-)  Pregnant        Other - negative ROS                         Anesthesia Plan    ASA 2     general   total IV anesthesia  intravenous induction     Anesthetic plan, risks, benefits, and alternatives have been provided, discussed and informed consent has been obtained with: patient.

## 2023-05-30 NOTE — ANESTHESIA POSTPROCEDURE EVALUATION
Patient: Arlette Mendoza    Procedure Summary     Date: 05/30/23 Room / Location: [facility] ENDOSCOPY 1 / [facility] ENDOSCOPY    Anesthesia Start: 1621 Anesthesia Stop: 1629    Procedure: ESOPHAGOGASTRODUODENOSCOPY possible dilation Diagnosis:       Gastroesophageal reflux disease with esophagitis without hemorrhage      Epigastric pain      (Gastroesophageal reflux disease with esophagitis without hemorrhage [K21.00])      (Epigastric pain [R10.13])    Surgeons: Vitaly Pacheco MD Provider: Jayy Silva CRNA    Anesthesia Type: general ASA Status: 2          Anesthesia Type: general    Vitals  No vitals data found for the desired time range.          Post Anesthesia Care and Evaluation    Patient location during evaluation: PHASE II  Patient participation: complete - patient participated  Level of consciousness: sleepy but conscious  Pain score: 0  Pain management: adequate    Airway patency: patent  Anesthetic complications: No anesthetic complications  PONV Status: none  Cardiovascular status: acceptable  Respiratory status: acceptable  Hydration status: acceptable    Comments: HR 99  /72  O2 SATS 99%  RR 18  TEMP 98.0 F  No anesthesia care post op

## 2023-06-02 LAB — REF LAB TEST METHOD: NORMAL

## 2023-06-06 ENCOUNTER — OFFICE VISIT (OUTPATIENT)
Dept: GASTROENTEROLOGY | Facility: CLINIC | Age: 28
End: 2023-06-06
Payer: COMMERCIAL

## 2023-06-06 VITALS
DIASTOLIC BLOOD PRESSURE: 78 MMHG | WEIGHT: 106.2 LBS | BODY MASS INDEX: 20.85 KG/M2 | HEIGHT: 60 IN | HEART RATE: 96 BPM | SYSTOLIC BLOOD PRESSURE: 112 MMHG

## 2023-06-06 DIAGNOSIS — K29.30 CHRONIC SUPERFICIAL GASTRITIS WITHOUT BLEEDING: ICD-10-CM

## 2023-06-06 DIAGNOSIS — K21.00 GASTROESOPHAGEAL REFLUX DISEASE WITH ESOPHAGITIS WITHOUT HEMORRHAGE: Primary | ICD-10-CM

## 2023-06-06 PROCEDURE — 99213 OFFICE O/P EST LOW 20 MIN: CPT | Performed by: NURSE PRACTITIONER

## 2023-06-06 RX ORDER — OMEPRAZOLE 40 MG/1
40 CAPSULE, DELAYED RELEASE ORAL DAILY
Qty: 30 CAPSULE | Refills: 11 | Status: SHIPPED | OUTPATIENT
Start: 2023-06-06

## 2023-06-06 NOTE — PROGRESS NOTES
Chief Complaint   Patient presents with    Heartburn     Endo f/u     Abdominal Pain       Subjective    Arlette Díaz Dyan Mendoza is a 28 y.o. female. she is here today for follow-up.                                                                  Assessment & Plan                                     1. Gastroesophageal reflux disease with esophagitis without hemorrhage    2. Chronic superficial gastritis without bleeding      Plan; start patient on Prilosec 40 mg/day discussed trying to wean off after 3-month course encouraged low acid diet avoidance of gastric irritants or any known triggers.  Follow-up in GI office sooner if needed.    Follow-up: Return in about 3 months (around 9/6/2023) for Recheck.     HPI  28-year-old female presents for recheck regarding GERD and to discuss EGD results.  Still has frequent episodes of reflux she is breast-feeding has talked to her lactation consultant and has been cleared to take Prilosec while lactating.  EGD noted grade 2 esophagitis gastritis and normal duodenum.  Antrum biopsy noted mild chronic inactive gastritis negative for H. pylori esophageal biopsy noted normal squamous epithelium negative for metaplasia dysplasia or malignancy.    Review of Systems  Review of Systems   Constitutional:  Negative for activity change, appetite change, chills, diaphoresis, fatigue, fever and unexpected weight change.   HENT:  Negative for sore throat and trouble swallowing.    Respiratory:  Negative for shortness of breath.    Gastrointestinal:  Positive for abdominal pain (gerd). Negative for abdominal distention, anal bleeding, blood in stool, constipation, diarrhea, nausea, rectal pain and vomiting.   Musculoskeletal:  Negative for arthralgias.   Skin:  Negative for pallor.   Neurological:  Negative for light-headedness.     /78 (BP Location: Left arm)    "Pulse 96   Ht 152.4 cm (60\")   Wt 48.2 kg (106 lb 3.2 oz)   LMP 05/15/2023 (Approximate)   BMI 20.74 kg/m²     Objective      Physical Exam  Constitutional:       General: She is not in acute distress.     Appearance: Normal appearance. She is normal weight. She is not ill-appearing or toxic-appearing.   HENT:      Head: Normocephalic and atraumatic.   Pulmonary:      Effort: Pulmonary effort is normal.   Abdominal:      General: Abdomen is flat. Bowel sounds are normal. There is no distension.      Palpations: Abdomen is soft. There is no mass.      Tenderness: There is no abdominal tenderness.   Neurological:      Mental Status: She is alert.             The following portions of the patient's history were reviewed and updated as appropriate:   Past Medical History:   Diagnosis Date    Acute laryngitis     Allergic rhinitis due to pollen     Asthma     Asthmatic tendency       Cough     Cyst of ovary     Encounter for initial prescription of contraceptive pills     Encounter for surveillance of contraceptive pills     Gastroesophageal reflux disease with esophagitis without hemorrhage 05/11/2023    Health maintenance examination     Nonspecific reaction to tuberculin skin test without active tuberculosis     Negatiave    Underimmunization status     Upper respiratory infection     Vaccination required     Visit for gynecologic examination     Well child visit      Past Surgical History:   Procedure Laterality Date    CHOLECYSTECTOMY      COLONOSCOPY N/A 11/15/2018    Procedure: COLONOSCOPY;  Surgeon: Jaiyeola, Aderemi, MD;  Location: [facility] ENDOSCOPY;  Service: Gastroenterology    ENDOSCOPY N/A 11/15/2018    Procedure: ESOPHAGOGASTRODUODENOSCOPY;  Surgeon: Jaiyeola, Aderemi, MD;  Location: [facility] ENDOSCOPY;  Service: Gastroenterology    INJECTION OF MEDICATION  04/17/2015    Celestone (betamethasone) (3)     INJECTION OF MEDICATION  01/18/2016    Depo Medrol (Methylprednisone) (9)      Family History   Problem " Relation Age of Onset    Ulcerative colitis Maternal Uncle     Ulcerative colitis Maternal Grandmother      OB History    No obstetric history on file.       Allergies   Allergen Reactions    Clindamycin Nausea And Vomiting    Azithromycin Nausea And Vomiting    Doxycycline Rash    Penicillins GI Intolerance and Nausea Only     Social History     Socioeconomic History    Marital status:    Tobacco Use    Smoking status: Never    Smokeless tobacco: Never   Substance and Sexual Activity    Alcohol use: No    Drug use: Defer    Sexual activity: Defer     Current Medications:  Prior to Admission medications    Medication Sig Start Date End Date Taking? Authorizing Provider   fluticasone (FLONASE) 50 MCG/ACT nasal spray 1 spray into the nostril(s) as directed by provider Daily.   Yes Magali Cortez MD   MULTIPLE VITAMINS-MINERALS PO Take  by mouth.   Yes Magali Cortez MD   norethindrone (MICRONOR) 0.35 MG tablet Take 1 tablet by mouth Daily. 4/19/23 4/19/24 Yes Magali Cortez MD     Orders placed during this encounter include:  No orders of the defined types were placed in this encounter.    * Surgery not found *  New Medications Ordered This Visit   Medications    omeprazole (priLOSEC) 40 MG capsule     Sig: Take 1 capsule by mouth Daily.     Dispense:  30 capsule     Refill:  11         Review and/or summary of lab tests, radiology, procedures, medications. Review and summary of old records and obtaining of history. The risks and benefits of my recommendations, as well as other treatment options were discussed . Any questions/concerned were answered. Patient voiced understanding and agreement.          This document has been electronically signed by DEDIRA Castro on June 6, 2023 10:55 CDT                                               Results for orders placed or performed during the hospital encounter of 05/30/23   TISSUE EXAM, [facility] ([facility])    Specimen: A: Gastric, Antrum;  Tissue    B: Esophagus, Distal; Tissue   Result Value Ref Range    Reference Lab Report       [facility]  [address] [place] [address]  Phone: [phone] or [phone]  Fax: [phone]  Kalen Rockwell M.D., Medical Director    PATIENT NAME                           LABORATORY NO.  [id number]  ASIM SILVA.     [id number]  [id number]                         AGE              SEX  SSN           CLIENT REF #  [facility]           28      1995  F    [id number]   [id number]    Alexander                       REQUESTING MPERLA.     ATTENDING M.D.     COPY [address]                 [place] [address]             DATE COLLECTED      DATE RECEIVED      DATE REPORTED  2023    DIAGNOSIS:  A.   ANTRUM, BIOPSY:  Mild chronic inactive gastritis.  Immunohistochemical stain for H. pylori is NEGATIVE.  B.   ESOPHAGUS, BIOPSY, DISTAL:  Squamous epithelium with no or only rare intraepithelial eosinophils  (<5/HPF).  Negative for intestinal metaplasia, dysplasia, or malignancy.    WJB    CLINICAL HISTORY:  Gastroesophageal reflux disease with esophagitis without hemorrhage, epigastric  pain    SPECIMENS RECEIVED:  A.  ANTRUM, BIOPSY  B.  ESOPHAGUS, BIOPSY, DISTAL    MICROSCOPIC DESCRIPTION:  Tissue blocks are prepared and slides are examined microscopically on all  specimens. See diagnosis for details.    The internal and external (both positive and negative) controls reacted  appropriately. Some of our immunohistochemical and in situ hybridization  studies are performed as analyte specific reagents. The following statement  applies to those tests: This test was developed, and its performance  characteristics determined by [facility]. It has not been  cleared or approved by the [place] Food and Drug Administration. However, the  FDA has  determined that approval and clearance are not necessary.    Professional interpretation rendered by Mohsen Carbajal D.O. at [facility],  [address]  [facility], [place] [address].    GROSS DESCRIPTION:  A.  Labeled gastric antrum are 3 portions of tan soft tissue measuring 0.5 x 0.4  x 0.2 cm in aggregate, submitted entirely in 1 block.  MTH  B.  Labeled distal esophagus are 2 portions of gray soft tissue measuring 0.8 x  0.3 x 0.1 cm in aggregate, submitted entirely in 1 block.    REVIEWED, DIAGNOSED AND ELECTRONICALLY  SIGNED BY:    Mohsen Carbajal D.O.  CPT CODES:  88305x2, 48430     Pregnancy, Urine - Urine, Clean Catch    Specimen: Urine, Clean Catch   Result Value Ref Range    HCG, Urine QL Negative Negative   Results for orders placed or performed in visit on 04/29/22   Comprehensive Metabolic Panel    Specimen: Blood   Result Value Ref Range    Glucose 81 65 - 99 mg/dL    BUN 8 6 - 20 mg/dL    Creatinine 0.60 0.57 - 1.00 mg/dL    Sodium 139 136 - 145 mmol/L    Potassium 4.0 3.5 - 5.2 mmol/L    Chloride 104 98 - 107 mmol/L    CO2 24.6 22.0 - 29.0 mmol/L    Calcium 9.4 8.6 - 10.5 mg/dL    Total Protein 7.2 6.0 - 8.5 g/dL    Albumin 4.40 3.50 - 5.20 g/dL    ALT (SGPT) 17 1 - 33 U/L    AST (SGOT) 21 1 - 32 U/L    Alkaline Phosphatase 119 (H) 39 - 117 U/L    Total Bilirubin 0.5 0.0 - 1.2 mg/dL    Globulin 2.8 gm/dL    A/G Ratio 1.6 g/dL    BUN/Creatinine Ratio 13.3 7.0 - 25.0    Anion Gap 10.4 5.0 - 15.0 mmol/L    eGFR 125.6 >60.0 mL/min/1.73   Results for orders placed or performed in visit on 11/05/19   CBC Auto Differential    Specimen: Blood   Result Value Ref Range    WBC 10.94 (H) 3.40 - 10.80 10*3/mm3    RBC 4.22 3.77 - 5.28 10*6/mm3    Hemoglobin 13.6 12.0 - 15.9 g/dL    Hematocrit 39.4 34.0 - 46.6 %    MCV 93.4 79.0 - 97.0 fL    MCH 32.2 26.6 - 33.0 pg    MCHC 34.5 31.5 - 35.7 g/dL    RDW 11.8 (L) 12.3 - 15.4 %    RDW-SD 40.6 37.0 - 54.0 fl    MPV 9.9 6.0 - 12.0 fL    Platelets 377 140 - 450  10*3/mm3    Neutrophil % 71.2 42.7 - 76.0 %    Lymphocyte % 18.0 (L) 19.6 - 45.3 %    Monocyte % 9.8 5.0 - 12.0 %    Eosinophil % 0.1 (L) 0.3 - 6.2 %    Basophil % 0.4 0.0 - 1.5 %    Immature Grans % 0.5 0.0 - 0.5 %    Neutrophils, Absolute 7.79 (H) 1.70 - 7.00 10*3/mm3    Lymphocytes, Absolute 1.97 0.70 - 3.10 10*3/mm3    Monocytes, Absolute 1.07 (H) 0.10 - 0.90 10*3/mm3    Eosinophils, Absolute 0.01 0.00 - 0.40 10*3/mm3    Basophils, Absolute 0.04 0.00 - 0.20 10*3/mm3    Immature Grans, Absolute 0.06 (H) 0.00 - 0.05 10*3/mm3    nRBC 0.0 0.0 - 0.2 /100 WBC   Comprehensive metabolic panel    Specimen: Blood   Result Value Ref Range    Glucose 77 65 - 99 mg/dL    BUN 7 6 - 20 mg/dL    Creatinine 0.63 0.57 - 1.00 mg/dL    Sodium 141 136 - 145 mmol/L    Potassium 3.7 3.5 - 5.2 mmol/L    Chloride 102 98 - 107 mmol/L    CO2 29.5 (H) 22.0 - 29.0 mmol/L    Calcium 9.0 8.6 - 10.5 mg/dL    Total Protein 7.6 6.0 - 8.5 g/dL    Albumin 4.20 3.50 - 5.20 g/dL    ALT (SGPT) 10 1 - 33 U/L    AST (SGOT) 15 1 - 32 U/L    Alkaline Phosphatase 73 39 - 117 U/L    Total Bilirubin 0.4 0.2 - 1.2 mg/dL    eGFR Non African Amer 115 >60 mL/min/1.73    Globulin 3.4 gm/dL    A/G Ratio 1.2 g/dL    BUN/Creatinine Ratio 11.1 7.0 - 25.0    Anion Gap 9.5 5.0 - 15.0 mmol/L   Results for orders placed or performed in visit on 03/01/19   CBC Auto Differential    Specimen: Blood   Result Value Ref Range    WBC 5.72 3.40 - 10.80 10*3/mm3    RBC 4.07 3.77 - 5.28 10*6/mm3    Hemoglobin 13.5 12.0 - 15.9 g/dL    Hematocrit 37.5 34.0 - 46.6 %    MCV 92.1 79.0 - 97.0 fL    MCH 33.2 (H) 26.6 - 33.0 pg    MCHC 36.0 (H) 31.5 - 35.7 g/dL    RDW 11.8 (L) 12.3 - 15.4 %    RDW-SD 39.7 37.0 - 54.0 fl    MPV 9.7 6.0 - 12.0 fL    Platelets 385 140 - 450 10*3/mm3    Neutrophil % 53.7 42.7 - 76.0 %    Lymphocyte % 35.5 19.6 - 45.3 %    Monocyte % 7.0 5.0 - 12.0 %    Eosinophil % 2.4 0.3 - 6.2 %    Basophil % 0.9 0.0 - 1.5 %    Immature Grans % 0.5 0.0 - 0.5 %     Neutrophils, Absolute 3.07 1.40 - 7.00 10*3/mm3    Lymphocytes, Absolute 2.03 0.70 - 3.10 10*3/mm3    Monocytes, Absolute 0.40 0.10 - 0.90 10*3/mm3    Eosinophils, Absolute 0.14 0.00 - 0.40 10*3/mm3    Basophils, Absolute 0.05 0.00 - 0.20 10*3/mm3    Immature Grans, Absolute 0.03 0.00 - 0.05 10*3/mm3    nRBC 0.0 0.0 - 0.0 /100 WBC   Comprehensive metabolic panel    Specimen: Blood   Result Value Ref Range    Glucose 81 60 - 100 mg/dL    BUN 8 7 - 21 mg/dL    Creatinine 0.57 0.50 - 1.00 mg/dL    Sodium 138 137 - 145 mmol/L    Potassium 4.1 3.5 - 5.1 mmol/L    Chloride 100 95 - 110 mmol/L    CO2 28.0 22.0 - 31.0 mmol/L    Calcium 9.3 8.4 - 10.2 mg/dL    Total Protein 7.6 6.3 - 8.6 g/dL    Albumin 4.30 3.40 - 4.80 g/dL    ALT (SGPT) 29 9 - 52 U/L    AST (SGOT) 55 (H) 14 - 36 U/L    Alkaline Phosphatase 102 38 - 126 U/L    Total Bilirubin 0.9 0.2 - 1.3 mg/dL    eGFR Non  Amer 129 71 - 165 mL/min/1.73    Globulin 3.3 2.3 - 3.5 gm/dL    A/G Ratio 1.3 1.1 - 1.8 g/dL    BUN/Creatinine Ratio 14.0 7.0 - 25.0    Anion Gap 10.0 5.0 - 15.0 mmol/L     *Note: Due to a large number of results and/or encounters for the requested time period, some results have not been displayed. A complete set of results can be found in Results Review.

## (undated) DEVICE — CONMED COLONOSCOPE SHEATHED CYTOLOGY BRUSH, RING HANDLE, 3 MM X 230 CM: Brand: CONMED

## (undated) DEVICE — BITEBLOCK ENDO W/STRAP 60F A/ LF DISP

## (undated) DEVICE — SINGLE-USE BIOPSY FORCEPS: Brand: RADIAL JAW 4

## (undated) DEVICE — CANN SMPL SOFTECH BIFLO ETCO2 A/M 7FT